# Patient Record
Sex: FEMALE | Race: WHITE | NOT HISPANIC OR LATINO | Employment: FULL TIME | URBAN - METROPOLITAN AREA
[De-identification: names, ages, dates, MRNs, and addresses within clinical notes are randomized per-mention and may not be internally consistent; named-entity substitution may affect disease eponyms.]

---

## 2023-05-07 ENCOUNTER — OFFICE VISIT (OUTPATIENT)
Age: 58
End: 2023-05-07

## 2023-05-07 VITALS
DIASTOLIC BLOOD PRESSURE: 82 MMHG | SYSTOLIC BLOOD PRESSURE: 139 MMHG | WEIGHT: 171 LBS | BODY MASS INDEX: 29.19 KG/M2 | HEIGHT: 64 IN | OXYGEN SATURATION: 99 % | RESPIRATION RATE: 18 BRPM | TEMPERATURE: 99.7 F | HEART RATE: 89 BPM

## 2023-05-07 DIAGNOSIS — Z00.00 ENCOUNTER FOR MEDICAL EXAMINATION TO ESTABLISH CARE: ICD-10-CM

## 2023-05-07 DIAGNOSIS — R10.11 RIGHT UPPER QUADRANT ABDOMINAL PAIN: ICD-10-CM

## 2023-05-07 DIAGNOSIS — N12 PYELONEPHRITIS: Primary | ICD-10-CM

## 2023-05-07 LAB
SL AMB  POCT GLUCOSE, UA: NEGATIVE
SL AMB LEUKOCYTE ESTERASE,UA: ABNORMAL
SL AMB POCT BILIRUBIN,UA: NEGATIVE
SL AMB POCT BLOOD,UA: ABNORMAL
SL AMB POCT CLARITY,UA: CLEAR
SL AMB POCT COLOR,UA: YELLOW
SL AMB POCT KETONES,UA: NEGATIVE
SL AMB POCT NITRITE,UA: POSITIVE
SL AMB POCT PH,UA: 5
SL AMB POCT SPECIFIC GRAVITY,UA: 1.01
SL AMB POCT URINE PROTEIN: 100
SL AMB POCT UROBILINOGEN: 0.2

## 2023-05-07 RX ORDER — SERTRALINE HYDROCHLORIDE 100 MG/1
100 TABLET, FILM COATED ORAL DAILY
COMMUNITY
Start: 2023-04-19

## 2023-05-07 RX ORDER — CIPROFLOXACIN 500 MG/1
500 TABLET, FILM COATED ORAL EVERY 12 HOURS SCHEDULED
Qty: 14 TABLET | Refills: 0 | Status: SHIPPED | OUTPATIENT
Start: 2023-05-07 | End: 2023-05-11

## 2023-05-07 RX ORDER — ONDANSETRON 4 MG/1
4 TABLET, FILM COATED ORAL EVERY 8 HOURS PRN
Qty: 21 TABLET | Refills: 0 | Status: SHIPPED | OUTPATIENT
Start: 2023-05-07 | End: 2023-05-14

## 2023-05-07 RX ORDER — LISINOPRIL 10 MG/1
5 TABLET ORAL DAILY
COMMUNITY
Start: 2023-04-19

## 2023-05-07 NOTE — PROGRESS NOTES
[address]        NAME: Geronimo Mahajan is a 62 y o  female  : 1965    MRN: [id number]  DATE: May 7, 2023  TIME: 11:44 AM    Assessment and Plan   Pyelonephritis [N12]  1  Pyelonephritis  ciprofloxacin (CIPRO) 500 mg tablet      2  Right upper quadrant abdominal pain  POCT urine dip    Urine culture    ondansetron (ZOFRAN) 4 mg tablet      3  Encounter for medical examination to establish care  Ambulatory Referral to [facility]        POC urine positive for UTI, will start on Cipro for presumed Pyelonephritis and send urine culture  VSS in clinic and patient overall appears well  Discussed with patient if symptoms don't improve or worsen within 24-48 hours, report to ER promptly for further evaluation of symptoms  Referral placed to family medicine to establish care and to manage chronic health conditions  Patient Instructions     Take antibiotics as directed, will call with urine culture results if need to change antibiotic  Complete entire course of antibiotic even if feeling better  Take medication with zofran and food to avoid upset stomach  May continue advil as needed for fever  Follow-up with family medicine within 3-5 days to establish care and manage long-term health conditions  Report to the ER promptly if no improvement of symptoms within 24-48 hours of starting antibiotic  Chief Complaint     Chief Complaint   Patient presents with   • Fever   • Vomiting   • Nausea     destin   • Diarrhea     Patient states that symptoms started 2 days ago, complains of right side pain  History of diverticulitis  Said she does not feel good  Otc taken  History of Present Illness       62year old female presents for evaluation of right-sided abdominal pain and fevers since Friday  Reports associated fevers, diarrhea (2 episodes), and some nausea  She reports a prior history of cholescystecomy, colon resection, and diverticulits  Last colonoscopy was 10 years ago   Denies any smoking, drinking, or illicit drug use  She has recently relocated from [facility] and has been unable to establish care in the area  She has taken Advil for fevers with some improvement  Abdominal Pain  This is a new problem  The current episode started in the past 7 days  The onset quality is sudden  The problem occurs constantly  The problem has been unchanged  The pain is located in the RUQ  The pain is at a severity of 8/10  The pain is moderate  The quality of the pain is aching and cramping  The abdominal pain radiates to the right flank  Associated symptoms include diarrhea, a fever and nausea  Pertinent negatives include no arthralgias, belching, constipation, dysuria, flatus, frequency, headaches, hematochezia, hematuria, melena, myalgias, vomiting or weight loss  Nothing aggravates the pain  The pain is relieved by nothing  Treatments tried: Advil  The treatment provided mild relief  Her past medical history is significant for abdominal surgery  Review of Systems   Review of Systems   Constitutional: Positive for chills and fever  Negative for activity change, appetite change, fatigue and weight loss  Respiratory: Negative for chest tightness and shortness of breath  Cardiovascular: Negative for chest pain and palpitations  Gastrointestinal: Positive for abdominal pain, diarrhea and nausea  Negative for constipation, flatus, hematochezia, melena and vomiting  Endocrine: Negative for polydipsia, polyphagia and polyuria  Genitourinary: Negative for decreased urine volume, difficulty urinating, dysuria, frequency and hematuria  Musculoskeletal: Positive for back pain  Negative for arthralgias and myalgias  Skin: Negative for color change and rash  Allergic/Immunologic: Negative for environmental allergies and food allergies  Neurological: Negative for dizziness, light-headedness and headaches           Current Medications       Current Outpatient Medications:   •  ciprofloxacin (CIPRO) 500 mg "tablet, Take 1 tablet (500 mg total) by mouth every 12 (twelve) hours for 7 days, Disp: 14 tablet, Rfl: 0  •  lisinopril (ZESTRIL) 10 mg tablet, Take 5 mg by mouth daily, Disp: , Rfl:   •  ondansetron (ZOFRAN) 4 mg tablet, Take 1 tablet (4 mg total) by mouth every 8 (eight) hours as needed for nausea or vomiting for up to 7 days, Disp: 21 tablet, Rfl: 0  •  sertraline (ZOLOFT) 100 mg tablet, Take 100 mg by mouth daily, Disp: , Rfl:     Current Allergies     Allergies as of 05/07/2023 - Reviewed 05/07/2023   Allergen Reaction Noted   • Gluten meal - food allergy Nausea Only 02/19/2021            The following portions of the patient's history were reviewed and updated as appropriate: allergies, current medications, past family history, past medical history, past social history, past surgical history and problem list      History reviewed  No pertinent past medical history  History reviewed  No pertinent surgical history  History reviewed  No pertinent family history  Medications have been verified  Objective   /82   Pulse 89   Temp 99 7 °F (37 6 °C)   Resp 18   Ht 5' 4\" (1 626 m)   Wt 77 6 kg (171 lb)   SpO2 99%   BMI 29 35 kg/m²        Physical Exam     Physical Exam  Vitals and nursing note reviewed  Constitutional:       Appearance: Normal appearance  She is normal weight  HENT:      Head: Normocephalic and atraumatic  Nose: No congestion or rhinorrhea  Mouth/Throat:      Mouth: Mucous membranes are moist    Eyes:      General: Scleral icterus: pro  Extraocular Movements: Extraocular movements intact  Conjunctiva/sclera: Conjunctivae normal       Pupils: Pupils are equal, round, and reactive to light  Cardiovascular:      Rate and Rhythm: Normal rate and regular rhythm  Pulses: Normal pulses  Heart sounds: Normal heart sounds  Pulmonary:      Effort: Pulmonary effort is normal       Breath sounds: Normal breath sounds     Abdominal:      General: " Bowel sounds are normal       Palpations: Abdomen is soft  Tenderness: There is abdominal tenderness  There is right CVA tenderness and guarding  There is no left CVA tenderness or rebound  Musculoskeletal:      Cervical back: Normal range of motion and neck supple  Skin:     General: Skin is warm and dry  Capillary Refill: Capillary refill takes less than 2 seconds  Neurological:      General: No focal deficit present  Mental Status: She is alert and oriented to person, place, and time  Psychiatric:         Mood and Affect: Mood normal          Behavior: Behavior normal          Thought Content:  Thought content normal          Judgment: Judgment normal

## 2023-05-07 NOTE — PATIENT INSTRUCTIONS
Take antibiotics as directed, will call with urine culture results if need to change antibiotic  Complete entire course of antibiotic even if feeling better  Take medication with zofran and food to avoid upset stomach  May continue advil as needed for fever  Follow-up with family medicine within 3-5 days to establish care and manage long-term health conditions  Report to the ER promptly if no improvement of symptoms within 24-48 hours of starting antibiotic

## 2023-05-08 ENCOUNTER — APPOINTMENT (EMERGENCY)
Dept: CT IMAGING | Facility: HOSPITAL | Age: 58
End: 2023-05-08

## 2023-05-08 ENCOUNTER — HOSPITAL ENCOUNTER (INPATIENT)
Facility: HOSPITAL | Age: 58
LOS: 3 days | Discharge: HOME | End: 2023-05-11
Attending: EMERGENCY MEDICINE | Admitting: FAMILY MEDICINE

## 2023-05-08 DIAGNOSIS — E87.6 HYPOKALEMIA: ICD-10-CM

## 2023-05-08 DIAGNOSIS — N12 PYELONEPHRITIS: Primary | ICD-10-CM

## 2023-05-08 DIAGNOSIS — A41.9 SEPSIS (HCC): ICD-10-CM

## 2023-05-08 PROBLEM — I10 HYPERTENSION: Status: ACTIVE | Noted: 2023-05-08

## 2023-05-08 PROBLEM — F32.A DEPRESSION: Status: ACTIVE | Noted: 2023-05-08

## 2023-05-08 LAB
ALBUMIN SERPL BCP-MCNC: 4.2 G/DL (ref 3.5–5)
ALP SERPL-CCNC: 54 U/L (ref 34–104)
ALT SERPL W P-5'-P-CCNC: 18 U/L (ref 7–52)
ANION GAP SERPL CALCULATED.3IONS-SCNC: 9 MMOL/L (ref 4–13)
AST SERPL W P-5'-P-CCNC: 14 U/L (ref 13–39)
BACTERIA UR QL AUTO: ABNORMAL /HPF
BASOPHILS # BLD AUTO: 0.03 THOUSANDS/ÂΜL (ref 0–0.1)
BASOPHILS NFR BLD AUTO: 0 % (ref 0–1)
BILIRUB SERPL-MCNC: 0.68 MG/DL (ref 0.2–1)
BILIRUB UR QL STRIP: NEGATIVE
BUN SERPL-MCNC: 14 MG/DL (ref 5–25)
CALCIUM SERPL-MCNC: 9.9 MG/DL (ref 8.4–10.2)
CHLORIDE SERPL-SCNC: 99 MMOL/L (ref 96–108)
CLARITY UR: ABNORMAL
CO2 SERPL-SCNC: 29 MMOL/L (ref 21–32)
COLOR UR: YELLOW
CREAT SERPL-MCNC: 0.77 MG/DL (ref 0.6–1.3)
EOSINOPHIL # BLD AUTO: 0.04 THOUSAND/ÂΜL (ref 0–0.61)
EOSINOPHIL NFR BLD AUTO: 0 % (ref 0–6)
ERYTHROCYTE [DISTWIDTH] IN BLOOD BY AUTOMATED COUNT: 12.8 % (ref 11.6–15.1)
GFR SERPL CREATININE-BSD FRML MDRD: 85 ML/MIN/1.73SQ M
GLUCOSE SERPL-MCNC: 134 MG/DL (ref 65–140)
GLUCOSE UR STRIP-MCNC: NEGATIVE MG/DL
HCT VFR BLD AUTO: 38.2 % (ref 34.8–46.1)
HGB BLD-MCNC: 13 G/DL (ref 11.5–15.4)
HGB UR QL STRIP.AUTO: ABNORMAL
IMM GRANULOCYTES # BLD AUTO: 0.09 THOUSAND/UL (ref 0–0.2)
IMM GRANULOCYTES NFR BLD AUTO: 1 % (ref 0–2)
KETONES UR STRIP-MCNC: NEGATIVE MG/DL
LACTATE SERPL-SCNC: 0.6 MMOL/L (ref 0.5–2)
LEUKOCYTE ESTERASE UR QL STRIP: ABNORMAL
LIPASE SERPL-CCNC: 7 U/L (ref 11–82)
LYMPHOCYTES # BLD AUTO: 1.01 THOUSANDS/ÂΜL (ref 0.6–4.47)
LYMPHOCYTES NFR BLD AUTO: 5 % (ref 14–44)
MCH RBC QN AUTO: 28.3 PG (ref 26.8–34.3)
MCHC RBC AUTO-ENTMCNC: 34 G/DL (ref 31.4–37.4)
MCV RBC AUTO: 83 FL (ref 82–98)
MONOCYTES # BLD AUTO: 1.45 THOUSAND/ÂΜL (ref 0.17–1.22)
MONOCYTES NFR BLD AUTO: 8 % (ref 4–12)
MUCOUS THREADS UR QL AUTO: ABNORMAL
NEUTROPHILS # BLD AUTO: 16.17 THOUSANDS/ÂΜL (ref 1.85–7.62)
NEUTS SEG NFR BLD AUTO: 86 % (ref 43–75)
NITRITE UR QL STRIP: NEGATIVE
NON-SQ EPI CELLS URNS QL MICRO: ABNORMAL /HPF
NRBC BLD AUTO-RTO: 0 /100 WBCS
PH UR STRIP.AUTO: 6 [PH]
PLATELET # BLD AUTO: 254 THOUSANDS/UL (ref 149–390)
PMV BLD AUTO: 10.6 FL (ref 8.9–12.7)
POTASSIUM SERPL-SCNC: 2.9 MMOL/L (ref 3.5–5.3)
PROCALCITONIN SERPL-MCNC: 0.34 NG/ML
PROT SERPL-MCNC: 7.7 G/DL (ref 6.4–8.4)
PROT UR STRIP-MCNC: ABNORMAL MG/DL
RBC # BLD AUTO: 4.6 MILLION/UL (ref 3.81–5.12)
RBC #/AREA URNS AUTO: ABNORMAL /HPF
SODIUM SERPL-SCNC: 137 MMOL/L (ref 135–147)
SP GR UR STRIP.AUTO: 1.02 (ref 1–1.03)
UROBILINOGEN UR STRIP-ACNC: 2 MG/DL
WBC # BLD AUTO: 18.79 THOUSAND/UL (ref 4.31–10.16)
WBC #/AREA URNS AUTO: ABNORMAL /HPF

## 2023-05-08 RX ORDER — POTASSIUM CHLORIDE 29.8 MG/ML
40 INJECTION INTRAVENOUS ONCE
Status: DISCONTINUED | OUTPATIENT
Start: 2023-05-08 | End: 2023-05-08

## 2023-05-08 RX ORDER — LISINOPRIL 5 MG/1
5 TABLET ORAL DAILY
Status: DISCONTINUED | OUTPATIENT
Start: 2023-05-09 | End: 2023-05-11 | Stop reason: HOSPADM

## 2023-05-08 RX ORDER — SERTRALINE HYDROCHLORIDE 100 MG/1
100 TABLET, FILM COATED ORAL DAILY
Status: DISCONTINUED | OUTPATIENT
Start: 2023-05-09 | End: 2023-05-11 | Stop reason: HOSPADM

## 2023-05-08 RX ORDER — POTASSIUM CHLORIDE 14.9 MG/ML
20 INJECTION INTRAVENOUS
Status: COMPLETED | OUTPATIENT
Start: 2023-05-08 | End: 2023-05-09

## 2023-05-08 RX ORDER — ONDANSETRON 2 MG/ML
4 INJECTION INTRAMUSCULAR; INTRAVENOUS ONCE
Status: COMPLETED | OUTPATIENT
Start: 2023-05-08 | End: 2023-05-08

## 2023-05-08 RX ORDER — ONDANSETRON 2 MG/ML
4 INJECTION INTRAMUSCULAR; INTRAVENOUS EVERY 6 HOURS PRN
Status: DISCONTINUED | OUTPATIENT
Start: 2023-05-08 | End: 2023-05-11 | Stop reason: HOSPADM

## 2023-05-08 RX ORDER — OXYBUTYNIN CHLORIDE 5 MG/1
5 TABLET ORAL 3 TIMES DAILY
Status: DISCONTINUED | OUTPATIENT
Start: 2023-05-08 | End: 2023-05-11 | Stop reason: HOSPADM

## 2023-05-08 RX ORDER — ENOXAPARIN SODIUM 100 MG/ML
40 INJECTION SUBCUTANEOUS DAILY
Status: DISCONTINUED | OUTPATIENT
Start: 2023-05-09 | End: 2023-05-11 | Stop reason: HOSPADM

## 2023-05-08 RX ORDER — ACETAMINOPHEN 325 MG/1
650 TABLET ORAL EVERY 6 HOURS PRN
Status: DISCONTINUED | OUTPATIENT
Start: 2023-05-08 | End: 2023-05-11 | Stop reason: HOSPADM

## 2023-05-08 RX ADMIN — ACETAMINOPHEN 650 MG: 325 TABLET ORAL at 23:33

## 2023-05-08 RX ADMIN — POTASSIUM CHLORIDE 20 MEQ: 14.9 INJECTION, SOLUTION INTRAVENOUS at 20:50

## 2023-05-08 RX ADMIN — SODIUM CHLORIDE 1000 ML: 0.9 INJECTION, SOLUTION INTRAVENOUS at 23:37

## 2023-05-08 RX ADMIN — SODIUM CHLORIDE 400 ML: 0.9 INJECTION, SOLUTION INTRAVENOUS at 21:15

## 2023-05-08 RX ADMIN — ONDANSETRON 4 MG: 2 INJECTION INTRAMUSCULAR; INTRAVENOUS at 23:35

## 2023-05-08 RX ADMIN — SODIUM CHLORIDE 1000 ML: 0.9 INJECTION, SOLUTION INTRAVENOUS at 19:39

## 2023-05-08 RX ADMIN — ONDANSETRON 4 MG: 2 INJECTION INTRAMUSCULAR; INTRAVENOUS at 21:02

## 2023-05-08 RX ADMIN — OXYBUTYNIN CHLORIDE 5 MG: 5 TABLET ORAL at 23:33

## 2023-05-08 RX ADMIN — IOHEXOL 100 ML: 350 INJECTION, SOLUTION INTRAVENOUS at 20:40

## 2023-05-08 RX ADMIN — CEFTRIAXONE SODIUM 2000 MG: 10 INJECTION, POWDER, FOR SOLUTION INTRAVENOUS at 21:50

## 2023-05-08 NOTE — ED PROVIDER NOTES
Pt Name: Anjali Jones  MRN: [id number]  [place] 1965  Age/Sex: 62 y o  female  Date of evaluation: 5/8/2023  PCP: No primary care provider on file  CHIEF COMPLAINT    Chief Complaint   Patient presents with   • Abdominal Pain     RUQ abdominal pain since Saturday, fever tmax 103 5, nausea and vomiting          HPI    62 y o  female presenting with right upper quadrant abdominal pain  Patient states that she began having the pain on Saturday, pain is currently dull, moderate to severe, in the right upper quadrant, radiating throughout the abdomen, worse with movement better at rest   Patient also notes fevers, with Tmax of 103 5  Patient was seen in urgent care and started on Cipro 3 days ago, states that since then she has had persistent nausea and vomiting has not been able to keep down food, fluids, or meds  Patient denies trauma, chest pain, shortness of breath, other symptoms  Patient denies changes in urine or bowel movements but notes prior episodes of UTIs without frequency urgency or pain  She notes a history of MS, bladder was last checked for urinary retention about 10 years ago  HPI      Past Medical and Surgical History    Notes past medical history of MS,  liver abscess as well as prior urinary tract infections and pyelonephritis  Past surgical history remarkable for cholecystectomy, partial colectomy, and hysterectomy      Family abdominal pain  Patient states that she began having pain history noncontributory      Social History     Tobacco Use   • Smoking status: Never   • Smokeless tobacco: Never   Substance Use Topics   • Alcohol use: Not Currently   • Drug use: Not Currently           Allergies    Allergies   Allergen Reactions   • Gluten Meal - Food Allergy Nausea Only       Home Medications    Prior to Admission medications    Medication Sig Start Date End Date Taking?  Authorizing Provider   ciprofloxacin (CIPRO) 500 mg tablet Take 1 tablet (500 mg total) by mouth every 12 (twelve) hours for 7 days 5/7/23 5/14/23  JOSH Carcamo   lisinopril (ZESTRIL) 10 mg tablet Take 5 mg by mouth daily 4/19/23   Historical Provider, MD   ondansetron (ZOFRAN) 4 mg tablet Take 1 tablet (4 mg total) by mouth every 8 (eight) hours as needed for nausea or vomiting for up to 7 days 5/7/23 5/14/23  JOSH Carcamo   sertraline (ZOLOFT) 100 mg tablet Take 100 mg by mouth daily 4/19/23   Historical Provider, MD           Review of Systems    Review of Systems   Constitutional: Negative for activity change, chills and fever  HENT: Negative for drooling and facial swelling  Eyes: Negative for pain, discharge and visual disturbance  Respiratory: Negative for apnea, cough, chest tightness, shortness of breath and wheezing  Cardiovascular: Negative for chest pain and leg swelling  Gastrointestinal: Positive for abdominal pain, nausea and vomiting  Negative for constipation and diarrhea  Genitourinary: Negative for difficulty urinating, dysuria and urgency  Musculoskeletal: Negative for arthralgias, back pain and gait problem  Skin: Negative for color change and rash  Neurological: Negative for dizziness, speech difficulty, weakness and headaches  Psychiatric/Behavioral: Negative for agitation, behavioral problems and confusion  All other systems reviewed and negative  Physical Exam      ED Triage Vitals   Temperature Pulse Respirations Blood Pressure SpO2   05/08/23 1810 05/08/23 1810 05/08/23 1810 05/08/23 1810 05/08/23 1810   99 2 °F (37 3 °C) (!) 109 18 147/74 96 %      Temp Source Heart Rate Source Patient Position - Orthostatic VS BP Location FiO2 (%)   05/08/23 1810 05/08/23 1810 05/08/23 1810 05/08/23 1810 --   Oral Monitor Sitting Left arm       Pain Score       05/08/23 1906       6               Physical Exam  Vitals and nursing note reviewed  Constitutional:       General: She is not in acute distress  Appearance: She is well-developed   She is ill-appearing  She is not toxic-appearing or diaphoretic  HENT:      Head: Normocephalic and atraumatic  Right Ear: External ear normal       Left Ear: External ear normal       Nose: Nose normal  No congestion or rhinorrhea  Mouth/Throat:      Mouth: Mucous membranes are dry  Pharynx: Oropharynx is clear  No oropharyngeal exudate or posterior oropharyngeal erythema  Eyes:      Conjunctiva/sclera: Conjunctivae normal       Pupils: Pupils are equal, round, and reactive to light  Cardiovascular:      Rate and Rhythm: Normal rate and regular rhythm  Pulses: Normal pulses  Heart sounds: Normal heart sounds  Pulmonary:      Effort: Pulmonary effort is normal  No respiratory distress  Breath sounds: Normal breath sounds  No wheezing or rales  Abdominal:      General: There is no distension  Palpations: Abdomen is soft  Tenderness: There is abdominal tenderness  There is right CVA tenderness and left CVA tenderness  There is no guarding or rebound  Comments: Tender to palpation the right upper quadrant, no rebound or guarding  Musculoskeletal:         General: No deformity or signs of injury  Normal range of motion  Cervical back: Normal range of motion and neck supple  Right lower leg: No edema  Left lower leg: No edema  Skin:     General: Skin is warm and dry  Capillary Refill: Capillary refill takes less than 2 seconds  Findings: No erythema or rash  Neurological:      Mental Status: She is alert and oriented to person, place, and time  Psychiatric:         Behavior: Behavior normal          Thought Content:  Thought content normal          Judgment: Judgment normal               Diagnostic Results      Labs:    Results Reviewed     Procedure Component Value Units Date/Time    Procalcitonin, Next Day AM Collection [[id number]]     Lab Status: No result Specimen: Blood     Comprehensive metabolic panel [[id number]]     Lab Status: No result Specimen: Blood     CBC (With Platelets) [[id number]]     Lab Status: No result Specimen: Blood     Procalcitonin [[id number]]  (Abnormal) Collected: 05/08/23 2153    Lab Status: Final result Specimen: Blood from Arm, Right Updated: 05/08/23 2226     Procalcitonin 0 34 ng/ml     Blood culture #1 [[id number]] Collected: 05/08/23 2215    Lab Status: In process Specimen: Blood from Arm, Right Updated: 05/08/23 2219    Lactic acid, plasma (w/reflex if result > 2 0) [[id number]]  (Normal) Collected: 05/08/23 2153    Lab Status: Final result Specimen: Blood from Arm, Right Updated: 05/08/23 2214     LACTIC ACID 0 6 mmol/L     Narrative:      Result may be elevated if tourniquet was used during collection  Blood culture #2 [[id number]] Collected: 05/08/23 2153    Lab Status:  In process Specimen: Blood from Arm, Right Updated: 05/08/23 2155    Lipase [[id number]]  (Abnormal) Collected: 05/08/23 1938    Lab Status: Final result Specimen: Blood from Arm, Left Updated: 05/08/23 2002     Lipase 7 u/L     Comprehensive metabolic panel [[id number]]  (Abnormal) Collected: 05/08/23 1938    Lab Status: Final result Specimen: Blood from Arm, Left Updated: 05/08/23 2002     Sodium 137 mmol/L      Potassium 2 9 mmol/L      Chloride 99 mmol/L      CO2 29 mmol/L      ANION GAP 9 mmol/L      BUN 14 mg/dL      Creatinine 0 77 mg/dL      Glucose 134 mg/dL      Calcium 9 9 mg/dL      AST 14 U/L      ALT 18 U/L      Alkaline Phosphatase 54 U/L      Total Protein 7 7 g/dL      Albumin 4 2 g/dL      Total Bilirubin 0 68 mg/dL      eGFR 85 ml/min/1 73sq m     Narrative:      [place] guidelines for Chronic Kidney Disease (CKD):   •  Stage 1 with normal or high GFR (GFR > 90 mL/min/1 73 square meters)  •  Stage 2 Mild CKD (GFR = 60-89 mL/min/1 73 square meters)  •  Stage 3A Moderate CKD (GFR = 45-59 mL/min/1 73 square meters)  •  Stage 3B Moderate CKD (GFR = 30-44 mL/min/1 73 square meters)  •  Stage 4 Severe CKD (GFR = 15-29 mL/min/1 73 square meters)  •  Stage 5 End Stage CKD (GFR <15 mL/min/1 73 square meters)  Note: GFR calculation is accurate only with a steady state creatinine    Urine Microscopic [[id number]]  (Abnormal) Collected: 05/08/23 1938    Lab Status: Final result Specimen: Urine, Clean Catch Updated: 05/08/23 1947     RBC, UA 2-4 /hpf      WBC, UA Innumerable /hpf      Epithelial Cells Occasional /hpf      Bacteria, UA None Seen /hpf      MUCUS THREADS Occasional    Urine culture [[id number]] Collected: 05/08/23 1938    Lab Status:  In process Specimen: Urine, Clean Catch Updated: 05/08/23 1947    UA w Reflex to Microscopic w Reflex to Culture [[id number]]  (Abnormal) Collected: 05/08/23 1938    Lab Status: Final result Specimen: Urine, Clean Catch Updated: 05/08/23 1946     Color, UA Yellow     Clarity, UA Turbid     Specific Gravity, UA 1 022     pH, UA 6 0     Leukocytes, UA Moderate     Nitrite, UA Negative     Protein, UA 70 (1+) mg/dl      Glucose, UA Negative mg/dl      Ketones, UA Negative mg/dl      Urobilinogen, UA 2 0 mg/dl      Bilirubin, UA Negative     Occult Blood, UA Trace    CBC and differential [[id number]]  (Abnormal) Collected: 05/08/23 1938    Lab Status: Final result Specimen: Blood from Arm, Left Updated: 05/08/23 1945     WBC 18 79 Thousand/uL      RBC 4 60 Million/uL      Hemoglobin 13 0 g/dL      Hematocrit 38 2 %      MCV 83 fL      MCH 28 3 pg      MCHC 34 0 g/dL      RDW 12 8 %      MPV 10 6 fL      Platelets 198 Thousands/uL      nRBC 0 /100 WBCs      Neutrophils Relative 86 %      Immat GRANS % 1 %      Lymphocytes Relative 5 %      Monocytes Relative 8 %      Eosinophils Relative 0 %      Basophils Relative 0 %      Neutrophils Absolute 16 17 Thousands/µL      Immature Grans Absolute 0 09 Thousand/uL      Lymphocytes Absolute 1 01 Thousands/µL      Monocytes Absolute 1 45 Thousand/µL      Eosinophils Absolute 0 04 Thousand/µL      Basophils Absolute 0 03 Thousands/µL           All labs reviewed and utilized in the medical decision making process    Radiology:    CT abdomen pelvis with contrast   Final Result      Multiple hypoenhancing regions throughout both kidneys with perinephric stranding consistent with multifocal pyelonephritis  The study was marked in [facility] for immediate notification  Workstation performed: [id number]             All radiology studies independently viewed by me and interpreted by the radiologist     Procedure    Procedures        ED Course of Care and Re-Assessments      CT performed, consistent with bilateral pyelonephritis  Started on broad-spectrum antibiotics  Urinalysis consistent with infection despite previous use of Cipro  Seen repleted      Medications   lisinopril (ZESTRIL) tablet 5 mg (has no administration in time range)   sertraline (ZOLOFT) tablet 100 mg (has no administration in time range)   oxybutynin (DITROPAN) tablet 5 mg (5 mg Oral Given 5/8/23 2333)   acetaminophen (TYLENOL) tablet 650 mg (650 mg Oral Given 5/8/23 2333)   enoxaparin (LOVENOX) subcutaneous injection 40 mg (has no administration in time range)   ondansetron (ZOFRAN) injection 4 mg (4 mg Intravenous Given 5/8/23 2335)   ceftriaxone (ROCEPHIN) 1 g/50 mL in dextrose IVPB (has no administration in time range)   multi-electrolyte (PLASMALYTE-A/ISOLYTE-S PH 7 4) IV solution (75 mL/hr Intravenous New Bag 5/9/23 0141)   sodium chloride 0 9 % bolus 1,000 mL (0 mL Intravenous Stopped 5/8/23 2330)   potassium chloride 20 mEq IVPB (premix) (20 mEq Intravenous New Bag 5/9/23 0156)   iohexol (OMNIPAQUE) 350 MG/ML injection (SINGLE-DOSE) 100 mL (100 mL Intravenous Given 5/8/23 2040)   ondansetron (ZOFRAN) injection 4 mg (4 mg Intravenous Given 5/8/23 2102)   ceftriaxone (ROCEPHIN) 2 g/50 mL in dextrose IVPB (0 mg Intravenous Stopped 5/8/23 2330)   sodium chloride 0 9 % bolus 1,000 mL (0 mL Intravenous Stopped 5/9/23 0215)   sodium chloride 0 9 % bolus 400 mL (0 mL Intravenous Stopped "5/8/23 5365)           FINAL IMPRESSION    Final diagnoses:   Pyelonephritis   Sepsis ([facility] [address] )   Hypokalemia         DISPOSITION/PLAN    Presentation as above felt most consistent with sepsis with pyelonephritis as a source  Hypokalemia also noted, likely due to persistent vomiting  Diagnostics in emergency department as above, consistent with sepsis but not severe sepsis or septic shock at time of ER stay  No evidence of bowel obstruction, intra-abdominal abscess, other surgical process  Started on broad-spectrum antibiotics, resuscitated with IV fluids, admitted to internal medicine for further care  Hemodynamically stable and comfortable at time of admit  Time reflects when diagnosis was documented in both MDM as applicable and the Disposition within this note     Time User Action Codes Description Comment    5/8/2023  9:44 PM Kelsy Craft Add [N12] Pyelonephritis     5/8/2023  9:44 PM Patria Poplin T Add [A41 9] Sepsis ([facility] [address] )     5/9/2023  4:37 AM Patria Poplin T Add [E87 6] Hypokalemia       ED Disposition     ED Disposition   Admit    Condition   Stable    Date/Time   Mon May 8, 2023  9:44 PM    Comment   Case was discussed with DOLORES and the patient's admission status was agreed to be Admission Status: inpatient status to the service of Dr Marco A Mendoza  Follow-up Information    None           PATIENT REFERRED TO:    No follow-up provider specified  DISCHARGE MEDICATIONS:    Patient's Medications   Discharge Prescriptions    No medications on file       No discharge procedures on file  Nicholas Ricketts MD    Portions of the record may have been created with voice recognition software  Occasional wrong word or \"sound alike\" substitutions may have occurred due to the inherent limitations of voice recognition software    Please read the chart carefully and recognize, using context, where substitutions have occurred     Nicholas Ricketts MD  05/09/23 0287    "

## 2023-05-08 NOTE — LETTER
[address] [place] [address]  Dept: [phone]    May 11, 2023     Patient: Giovanna Ortiz   YOB: 1965   Date of Visit: 5/8/2023       To Whom it May Concern:    Giovanna Ortiz is under my professional care  She was seen in the hospital from 5/8/2023 to 05/11/23  She may return to work on 05/18/2023 without limitations  If you have any questions or concerns, please don't hesitate to call           Sincerely,          Josy Stovall MD

## 2023-05-09 PROBLEM — N12 PYELONEPHRITIS: Status: ACTIVE | Noted: 2023-05-09

## 2023-05-09 LAB
ALBUMIN SERPL BCP-MCNC: 3.2 G/DL (ref 3.5–5)
ALP SERPL-CCNC: 38 U/L (ref 34–104)
ALT SERPL W P-5'-P-CCNC: 14 U/L (ref 7–52)
ANION GAP SERPL CALCULATED.3IONS-SCNC: 6 MMOL/L (ref 4–13)
AST SERPL W P-5'-P-CCNC: 12 U/L (ref 13–39)
BILIRUB SERPL-MCNC: 0.41 MG/DL (ref 0.2–1)
BUN SERPL-MCNC: 8 MG/DL (ref 5–25)
CALCIUM ALBUM COR SERPL-MCNC: 8.8 MG/DL (ref 8.3–10.1)
CALCIUM SERPL-MCNC: 8.2 MG/DL (ref 8.4–10.2)
CHLORIDE SERPL-SCNC: 108 MMOL/L (ref 96–108)
CO2 SERPL-SCNC: 26 MMOL/L (ref 21–32)
CREAT SERPL-MCNC: 0.58 MG/DL (ref 0.6–1.3)
ERYTHROCYTE [DISTWIDTH] IN BLOOD BY AUTOMATED COUNT: 13 % (ref 11.6–15.1)
GFR SERPL CREATININE-BSD FRML MDRD: 102 ML/MIN/1.73SQ M
GLUCOSE SERPL-MCNC: 114 MG/DL (ref 65–140)
HCT VFR BLD AUTO: 32 % (ref 34.8–46.1)
HGB BLD-MCNC: 10.4 G/DL (ref 11.5–15.4)
MCH RBC QN AUTO: 27.5 PG (ref 26.8–34.3)
MCHC RBC AUTO-ENTMCNC: 32.5 G/DL (ref 31.4–37.4)
MCV RBC AUTO: 85 FL (ref 82–98)
PLATELET # BLD AUTO: 206 THOUSANDS/UL (ref 149–390)
PMV BLD AUTO: 10.7 FL (ref 8.9–12.7)
POTASSIUM SERPL-SCNC: 3.2 MMOL/L (ref 3.5–5.3)
PROCALCITONIN SERPL-MCNC: 0.33 NG/ML
PROT SERPL-MCNC: 5.9 G/DL (ref 6.4–8.4)
RBC # BLD AUTO: 3.78 MILLION/UL (ref 3.81–5.12)
SODIUM SERPL-SCNC: 140 MMOL/L (ref 135–147)
WBC # BLD AUTO: 11.55 THOUSAND/UL (ref 4.31–10.16)

## 2023-05-09 RX ORDER — HYDROMORPHONE HCL/PF 1 MG/ML
0.5 SYRINGE (ML) INJECTION EVERY 6 HOURS PRN
Status: DISCONTINUED | OUTPATIENT
Start: 2023-05-09 | End: 2023-05-11 | Stop reason: HOSPADM

## 2023-05-09 RX ORDER — POTASSIUM CHLORIDE 20MEQ/15ML
40 LIQUID (ML) ORAL ONCE
Status: DISCONTINUED | OUTPATIENT
Start: 2023-05-09 | End: 2023-05-09

## 2023-05-09 RX ORDER — POTASSIUM CHLORIDE 20 MEQ/1
40 TABLET, EXTENDED RELEASE ORAL ONCE
Status: COMPLETED | OUTPATIENT
Start: 2023-05-09 | End: 2023-05-09

## 2023-05-09 RX ORDER — SACCHAROMYCES BOULARDII 250 MG
250 CAPSULE ORAL 2 TIMES DAILY
Status: DISCONTINUED | OUTPATIENT
Start: 2023-05-09 | End: 2023-05-11 | Stop reason: HOSPADM

## 2023-05-09 RX ORDER — SODIUM CHLORIDE, SODIUM GLUCONATE, SODIUM ACETATE, POTASSIUM CHLORIDE, MAGNESIUM CHLORIDE, SODIUM PHOSPHATE, DIBASIC, AND POTASSIUM PHOSPHATE .53; .5; .37; .037; .03; .012; .00082 G/100ML; G/100ML; G/100ML; G/100ML; G/100ML; G/100ML; G/100ML
75 INJECTION, SOLUTION INTRAVENOUS CONTINUOUS
Status: DISCONTINUED | OUTPATIENT
Start: 2023-05-09 | End: 2023-05-11 | Stop reason: HOSPADM

## 2023-05-09 RX ADMIN — SODIUM CHLORIDE, SODIUM GLUCONATE, SODIUM ACETATE, POTASSIUM CHLORIDE AND MAGNESIUM CHLORIDE 75 ML/HR: 526; 502; 368; 37; 30 INJECTION, SOLUTION INTRAVENOUS at 01:41

## 2023-05-09 RX ADMIN — HYDROMORPHONE HYDROCHLORIDE 0.5 MG: 1 INJECTION, SOLUTION INTRAMUSCULAR; INTRAVENOUS; SUBCUTANEOUS at 20:59

## 2023-05-09 RX ADMIN — CEFTRIAXONE SODIUM 1000 MG: 10 INJECTION, POWDER, FOR SOLUTION INTRAVENOUS at 21:00

## 2023-05-09 RX ADMIN — POTASSIUM CHLORIDE 20 MEQ: 14.9 INJECTION, SOLUTION INTRAVENOUS at 01:56

## 2023-05-09 RX ADMIN — OXYBUTYNIN CHLORIDE 5 MG: 5 TABLET ORAL at 21:00

## 2023-05-09 RX ADMIN — POTASSIUM CHLORIDE 40 MEQ: 1500 TABLET, EXTENDED RELEASE ORAL at 09:38

## 2023-05-09 RX ADMIN — ENOXAPARIN SODIUM 40 MG: 40 INJECTION SUBCUTANEOUS at 08:33

## 2023-05-09 RX ADMIN — OXYBUTYNIN CHLORIDE 5 MG: 5 TABLET ORAL at 08:33

## 2023-05-09 RX ADMIN — Medication 250 MG: at 08:33

## 2023-05-09 RX ADMIN — SODIUM CHLORIDE, SODIUM GLUCONATE, SODIUM ACETATE, POTASSIUM CHLORIDE AND MAGNESIUM CHLORIDE 75 ML/HR: 526; 502; 368; 37; 30 INJECTION, SOLUTION INTRAVENOUS at 20:59

## 2023-05-09 RX ADMIN — SERTRALINE HYDROCHLORIDE 100 MG: 100 TABLET ORAL at 08:52

## 2023-05-09 RX ADMIN — LISINOPRIL 5 MG: 5 TABLET ORAL at 08:33

## 2023-05-09 RX ADMIN — ONDANSETRON 4 MG: 2 INJECTION INTRAMUSCULAR; INTRAVENOUS at 20:58

## 2023-05-09 RX ADMIN — Medication 250 MG: at 18:31

## 2023-05-09 RX ADMIN — OXYBUTYNIN CHLORIDE 5 MG: 5 TABLET ORAL at 16:53

## 2023-05-09 NOTE — PLAN OF CARE
Problem: GASTROINTESTINAL - ADULT  Goal: Minimal or absence of nausea and/or vomiting  Description: INTERVENTIONS:  - Administer IV fluids if ordered to ensure adequate hydration  - Maintain NPO status until nausea and vomiting are resolved  - Nasogastric tube if ordered  - Administer ordered antiemetic medications as needed  - Provide nonpharmacologic comfort measures as appropriate  - Advance diet as tolerated, if ordered  - Consider nutrition services referral to assist patient with adequate nutrition and appropriate food choices  Outcome: Progressing  Goal: Maintains adequate nutritional intake  Description: INTERVENTIONS:  - Monitor percentage of each meal consumed  - Identify factors contributing to decreased intake, treat as appropriate  - Assist with meals as needed  - Monitor I&O, weight, and lab values if indicated  - Obtain nutrition services referral as needed  Outcome: Progressing     Problem: GENITOURINARY - ADULT  Goal: Maintains or returns to baseline urinary function  Description: INTERVENTIONS:  - Assess urinary function  - Encourage oral fluids to ensure adequate hydration if ordered  - Administer IV fluids as ordered to ensure adequate hydration  - Administer ordered medications as needed  - Offer frequent toileting  - Follow urinary retention protocol if ordered  Outcome: Progressing     Problem: METABOLIC, FLUID AND ELECTROLYTES - ADULT  Goal: Electrolytes maintained within normal limits  Description: INTERVENTIONS:  - Monitor labs and assess patient for signs and symptoms of electrolyte imbalances  - Administer electrolyte replacement as ordered  - Monitor response to electrolyte replacements, including repeat lab results as appropriate  - Instruct patient on fluid and nutrition as appropriate  Outcome: Progressing

## 2023-05-09 NOTE — PROGRESS NOTES
[address] [place] [address]  Progress Note  Name: Monty Vargas  MRN: [id number]  Unit/Bed#: ED 08 I Date of Admission: 5/8/2023   Date of Service: 5/9/2023 I Hospital Day: 1    Assessment/Plan   Pyelonephritis  Assessment & Plan  · Noted on CT - Multiple hypoenhancing regions throughout both kidneys with perinephric stranding consistent with multifocal pyelonephritis  · This is causing sepsis as mentioned  · Continue ATBs    * Sepsis [facility])  Assessment & Plan  Patient presenting to the ED for fever/chills, fatigue, dysuria, flank pain  Patient was seen by urgent care a few days ago and prescribed ciprofloxacin for UTI  Due to persistent pain and vomiting patient returning to ED for evaluation  · Meeting sepsis criteria for leukocytosis and tachycardia + pyelo  · CT A/P: Multiple hypoenhancing regions throughout both kidneys with perinephric stranding consistent with multifocal pyelonephritis  · Continue ceftriaxone  · Follow cultures    Depression  Assessment & Plan  · Mood stable  · Continue home Zoloft  · Continue to monitor    Hypertension  Assessment & Plan  · BP stable on admission  · Continue home lisinopril  · Monitor blood pressure         VTE Pharmacologic Prophylaxis:   Pharmacologic: Enoxaparin (Lovenox)  Mechanical VTE Prophylaxis in Place: Yes    Patient Centered Rounds: I have performed bedside rounds with nursing staff today  Discussions with Specialists or Other Care Team Provider: Discussed with care management team    Education and Discussions with Family / Patient: Patient - she did not ask me to talk to anyone    Time Spent for Care: 1 hour  More than 50% of total time spent on counseling and coordination of care as described above      Current Length of Stay: 1 day(s)    Current Patient Status: Inpatient   Certification Statement: The patient will continue to require additional inpatient hospital stay due to need for IV antibiotics, waiting for cultures    Discharge Plan: 48-72h    Code Status: Level 1 - Full Code      Subjective:     Patient evaluated this morning  She does mention that her pain is improving in the flank area but  Denies any nausea or vomiting  No other events reported    Objective:     Vitals:   Temp (24hrs), Av 4 °F (37 4 °C), Min:99 2 °F (37 3 °C), Max:99 6 °F (37 6 °C)    Temp:  [99 2 °F (37 3 °C)-99 6 °F (37 6 °C)] 99 6 °F (37 6 °C)  HR:  [] 77  Resp:  [16-18] 16  BP: (121-147)/(65-74) 121/71  SpO2:  [96 %-98 %] 96 %  Body mass index is 29 87 kg/m²  Input and Output Summary (last 24 hours): Intake/Output Summary (Last 24 hours) at 2023 0933  Last data filed at 2023 0501  Gross per 24 hour   Intake 2800 ml   Output --   Net 2800 ml       Physical Exam:     Physical Exam  Vitals and nursing note reviewed  Constitutional:       Appearance: Normal appearance  She is normal weight  Comments: Female in bed, awake   HENT:      Head: Normocephalic and atraumatic  Right Ear: External ear normal       Left Ear: External ear normal       Nose: Nose normal  No congestion  Mouth/Throat:      Mouth: Mucous membranes are moist       Pharynx: Oropharynx is clear  No oropharyngeal exudate or posterior oropharyngeal erythema  Eyes:      General: No scleral icterus  Right eye: No discharge  Left eye: No discharge  Extraocular Movements: Extraocular movements intact  Conjunctiva/sclera: Conjunctivae normal       Pupils: Pupils are equal, round, and reactive to light  Cardiovascular:      Rate and Rhythm: Normal rate and regular rhythm  Pulses: Normal pulses  Heart sounds: Normal heart sounds  No murmur heard  No friction rub  No gallop  Pulmonary:      Effort: Pulmonary effort is normal  No respiratory distress  Breath sounds: Normal breath sounds  No stridor  No wheezing, rhonchi or rales  Chest:      Chest wall: No tenderness  Abdominal:      General: Abdomen is flat   Bowel sounds are normal  There is no distension  Palpations: Abdomen is soft  There is no mass  Tenderness: There is no abdominal tenderness  There is no guarding or rebound  Musculoskeletal:         General: No swelling, tenderness, deformity or signs of injury  Normal range of motion  Cervical back: Normal range of motion and neck supple  No rigidity  No muscular tenderness  Skin:     General: Skin is warm and dry  Capillary Refill: Capillary refill takes less than 2 seconds  Coloration: Skin is not jaundiced or pale  Findings: No bruising, erythema, lesion or rash  Neurological:      General: No focal deficit present  Mental Status: She is alert and oriented to person, place, and time  Mental status is at baseline  Cranial Nerves: No cranial nerve deficit  Sensory: No sensory deficit  Motor: No weakness  Coordination: Coordination normal    Psychiatric:         Mood and Affect: Mood normal          Behavior: Behavior normal          Thought Content: Thought content normal          Judgment: Judgment normal            Additional Data:     Labs:    Results from last 7 days   Lab Units 05/09/23  0517 05/08/23  1938   WBC Thousand/uL 11 55* 18 79*   HEMOGLOBIN g/dL 10 4* 13 0   HEMATOCRIT % 32 0* 38 2   PLATELETS Thousands/uL 206 254   NEUTROS PCT %  --  86*   LYMPHS PCT %  --  5*   MONOS PCT %  --  8   EOS PCT %  --  0     Results from last 7 days   Lab Units 05/09/23  0517   SODIUM mmol/L 140   POTASSIUM mmol/L 3 2*   CHLORIDE mmol/L 108   CO2 mmol/L 26   BUN mg/dL 8   CREATININE mg/dL 0 58*   ANION GAP mmol/L 6   CALCIUM mg/dL 8 2*   ALBUMIN g/dL 3 2*   TOTAL BILIRUBIN mg/dL 0 41   ALK PHOS U/L 38   ALT U/L 14   AST U/L 12*   GLUCOSE RANDOM mg/dL 114                 Results from last 7 days   Lab Units 05/09/23  0517 05/08/23  2153   LACTIC ACID mmol/L  --  0 6   PROCALCITONIN ng/ml 0 33* 0 34*           * I Have Reviewed All Lab Data Listed Above    * Additional Pertinent [facility] Tests [facility]ed: [facility]      Recent Cultures (last 7 days):     Results from last 7 days   Lab Units 05/08/23  2215 05/08/23  2153   BLOOD CULTURE  Received in Microbiology Lab  Culture in Progress  Received in Microbiology Lab  Culture in Progress  Last 24 Hours Medication List:   Current Facility-Administered Medications   Medication Dose Route Frequency Provider Last Rate   • acetaminophen  650 mg Oral Q6H PRN David Dominguez PA-C     • cefTRIAXone  1,000 mg Intravenous Q24H Brie Gonzalez PA-C     • enoxaparin  40 mg Subcutaneous Daily Brie Gonzalez PA-C     • lisinopril  5 mg Oral Daily Brie Gonzalez PA-C     • multi-electrolyte  75 mL/hr Intravenous Continuous Brie Gonzalez PA-C 75 mL/hr (05/09/23 0843)   • ondansetron  4 mg Intravenous Q6H PRN Brei Gonzalez PA-C     • oxybutynin  5 mg Oral TID David Dominguez PA-C     • potassium chloride  40 mEq Oral Once Billy Gifford MD     • saccharomyces boulardii  250 mg Oral BID Billy Gifford MD     • sertraline  100 mg Oral Daily David Dominguez PA-C          Today, Patient Was Seen By: Billy Gifford MD    ** Please Note: Dictation voice to text software may have been used in the creation of this document   **

## 2023-05-09 NOTE — UTILIZATION REVIEW
Initial Clinical Review    Admission: Date/Time/Statement:   Admission Orders (From admission, onward)     Ordered        05/08/23 2148  INPATIENT ADMISSION  Once                      Orders Placed This Encounter   Procedures   • INPATIENT ADMISSION     Standing Status:   Standing     Number of Occurrences:   1     Order Specific Question:   Level of Care     Answer:   Med Surg [16]     Order Specific Question:   Estimated length of stay     Answer:   More than 2 Midnights     Order Specific Question:   Certification     Answer:   I certify that inpatient services are medically necessary for this patient for a duration of greater than two midnights  See H&P and MD Progress Notes for additional information about the patient's course of treatment  ED Arrival Information     Expected   -    Arrival   5/8/2023 18:07    Acuity   Urgent            Means of arrival   Walk-In    Escorted by   Self    Service   Hospitalist    Admission type   Emergency            Arrival complaint   Flank Pain            Chief Complaint   Patient presents with   • Abdominal Pain     RUQ abdominal pain since Saturday, fever tmax 103 5, nausea and vomiting        Initial Presentation: 62 y o  female who presented self from home to [facility] ED  Inpatient admission for evaluation and treatment of sepsis  PMHx: HTN, depression  Presented w/ fever, chills, fatigue, dysuria, flank pain  Trial of PO ABX for UTI without improvement  On exam, tachycardic, b/l CVA tenderness, abdominal tenderness  Imaging consistent w/ pyelonephritis  WBC 18 79  Plan: IV ABX, follow urine cultures and blood cultures, Trend labs, replete electrolytes as needed; continue PTA meds  Date: 05/09/23   Day 2: Reports improving flank pain  Exam unremarkable  Plan: continue IV ABX, follow cultures, continue PTA meds, Trend labs, replete electrolytes as needed      ED Triage Vitals   Temperature Pulse Respirations Blood Pressure SpO2   05/08/23 1810 05/08/23 1810 05/08/23 1810 05/08/23 1810 05/08/23 1810   99 2 °F (37 3 °C) (!) 109 18 147/74 96 %      Temp Source Heart Rate Source Patient Position - Orthostatic VS BP Location FiO2 (%)   05/08/23 1810 05/08/23 1810 05/08/23 1810 05/08/23 1810 --   Oral Monitor Sitting Left arm       Pain Score       05/08/23 1906       6          Wt Readings from Last 1 Encounters:   05/08/23 78 9 kg (174 lb)     Additional Vital Signs:   Date/Time Temp Pulse Resp BP MAP (mmHg) SpO2 O2 Device   05/09/23 0749 99 6 °F (37 6 °C) 77 16 121/71 91 96 % None (Room air)   05/08/23 2320 -- 87 16 131/65 -- 98 % None (Room air)     Pertinent Labs/Diagnostic Test Results:   CT abdomen pelvis with contrast   Final Result by Pb Thibodeaux MD (05/08 2133)      Multiple hypoenhancing regions throughout both kidneys with perinephric stranding consistent with multifocal pyelonephritis  The study was marked in [facility] for immediate notification                 Workstation performed: [id number]               Results from last 7 days   Lab Units 05/09/23 0517 05/08/23 1938   WBC Thousand/uL 11 55* 18 79*   HEMOGLOBIN g/dL 10 4* 13 0   HEMATOCRIT % 32 0* 38 2   PLATELETS Thousands/uL 206 254   NEUTROS ABS Thousands/µL  --  16 17*         Results from last 7 days   Lab Units 05/09/23  0517 05/08/23 1938   SODIUM mmol/L 140 137   POTASSIUM mmol/L 3 2* 2 9*   CHLORIDE mmol/L 108 99   CO2 mmol/L 26 29   ANION GAP mmol/L 6 9   BUN mg/dL 8 14   CREATININE mg/dL 0 58* 0 77   EGFR ml/min/1 73sq m 102 85   CALCIUM mg/dL 8 2* 9 9     Results from last 7 days   Lab Units 05/09/23  0517 05/08/23  1938   AST U/L 12* 14   ALT U/L 14 18   ALK PHOS U/L 38 54   TOTAL PROTEIN g/dL 5 9* 7 7   ALBUMIN g/dL 3 2* 4 2   TOTAL BILIRUBIN mg/dL 0 41 0 68         Results from last 7 days   Lab Units 05/09/23  0517 05/08/23  1938   GLUCOSE RANDOM mg/dL 114 134       Results from last 7 days   Lab Units 05/09/23  0517 05/08/23  2153   PROCALCITONIN ng/ml 0 33* 0 34*     Results from last 7 days   Lab Units 05/08/23 2153   LACTIC ACID mmol/L 0 6     Results from last 7 days   Lab Units 05/08/23  1938   LIPASE u/L 7*     Results from last 7 days   Lab Units 05/08/23  1938 05/07/23  1146   CLARITY UA  Turbid clear   COLOR UA  Yellow yellow   SPEC GRAV UA  1 022  --    PH UA  6 0  --    GLUCOSE UA mg/dl Negative negative   KETONES UA mg/dl Negative negative   BLOOD UA  Trace* trace   PROTEIN UA mg/dl 70 (1+)* 100   NITRITE UA  Negative positive   BILIRUBIN UA  Negative  --    BILIRUBIN UA POC   --  negative   UROBILINOGEN UA   --  0 2   UROBILINOGEN UA (BE) mg/dl 2 0*  --    LEUKOCYTES UA  Moderate* large   WBC UA /hpf Innumerable*  --    RBC UA /hpf 2-4*  --    BACTERIA UA /hpf None Seen  --    EPITHELIAL CELLS WET PREP /hpf Occasional  --    MUCUS THREADS  Occasional*  --      Results from last 7 days   Lab Units 05/08/23 2215 05/08/23 2153   BLOOD CULTURE  Received in Microbiology Lab  Culture in Progress  Received in Microbiology Lab  Culture in Progress           ED Treatment:   Medication Administration from 05/08/2023 1807 to 05/09/2023 1246       Date/Time Order Dose Route Action     05/08/2023 1939 EDT sodium chloride 0 9 % bolus 1,000 mL 1,000 mL Intravenous New Bag     05/09/2023 0156 EDT potassium chloride 20 mEq IVPB (premix) 20 mEq Intravenous New Bag     05/08/2023 2050 EDT potassium chloride 20 mEq IVPB (premix) 20 mEq Intravenous New Bag     05/08/2023 2040 EDT iohexol (OMNIPAQUE) 350 MG/ML injection (SINGLE-DOSE) 100 mL 100 mL Intravenous Given     05/08/2023 2102 EDT ondansetron (ZOFRAN) injection 4 mg 4 mg Intravenous Given     05/08/2023 2150 EDT ceftriaxone (ROCEPHIN) 2 g/50 mL in dextrose IVPB 2,000 mg Intravenous New Bag     05/08/2023 2337 EDT sodium chloride 0 9 % bolus 1,000 mL 1,000 mL Intravenous New Bag     05/08/2023 2115 EDT sodium chloride 0 9 % bolus 400 mL 400 mL Intravenous New Bag     05/09/2023 0833 EDT lisinopril (ZESTRIL) tablet 5 mg 5 mg Oral Given 05/09/2023 9731 EDT sertraline (ZOLOFT) tablet 100 mg 100 mg Oral Given     05/09/2023 0833 EDT oxybutynin (DITROPAN) tablet 5 mg 5 mg Oral Given     05/08/2023 2333 EDT oxybutynin (DITROPAN) tablet 5 mg 5 mg Oral Given     05/08/2023 2333 EDT acetaminophen (TYLENOL) tablet 650 mg 650 mg Oral Given     05/09/2023 0833 EDT enoxaparin (LOVENOX) subcutaneous injection 40 mg 40 mg Subcutaneous Given     05/08/2023 2335 EDT ondansetron (ZOFRAN) injection 4 mg 4 mg Intravenous Given     05/09/2023 0843 EDT multi-electrolyte (PLASMALYTE-A/ISOLYTE-S PH 7 4) IV solution 75 mL/hr Intravenous Rate/ Dose Change     05/09/2023 0141 EDT multi-electrolyte (PLASMALYTE-A/ISOLYTE-S PH 7 4) IV solution 75 mL/hr Intravenous New Bag     05/09/2023 0833 EDT saccharomyces boulardii (FLORASTOR) capsule 250 mg 250 mg Oral Given     05/09/2023 0938 EDT potassium chloride (K-DUR,KLOR-CON) CR tablet 40 mEq 40 mEq Oral Given        History reviewed  No pertinent past medical history  Present on Admission:  • Pyelonephritis      Admitting Diagnosis: Abdominal pain [R10 9]  Age/Sex: 62 y o  female  Admission Orders:  Regular Diet  SCDs  Scheduled Medications:  cefTRIAXone, 1,000 mg, Intravenous, Q24H  enoxaparin, 40 mg, Subcutaneous, Daily  lisinopril, 5 mg, Oral, Daily  oxybutynin, 5 mg, Oral, TID  saccharomyces boulardii, 250 mg, Oral, BID  sertraline, 100 mg, Oral, Daily    Continuous IV Infusions:  multi-electrolyte, 75 mL/hr, Intravenous, Continuous    PRN Meds:  acetaminophen, 650 mg, Oral, Q6H PRN  ondansetron, 4 mg, Intravenous, Q6H PRN        Network Utilization Review Department  ATTENTION: Please call with any questions or concerns to [phone] and carefully listen to the prompts so that you are directed to the right person   All voicemails are confidential   Gauri Pleasure all requests for admission clinical reviews, approved or denied determinations and any other requests to dedicated fax number below belonging to the campus where the patient is receiving treatment   List of dedicated fax numbers for the Facilities:  [address] [place] [address] DENIALS (Administrative/Medical Necessity) [phone]   [address]  (Maternity/NICU/Pediatrics) [phone]   [address] [phone]   [address] [phone]   [address] [address] [address] [place] [address] [phone]   [facility] [address] [facility] [phone]

## 2023-05-09 NOTE — ASSESSMENT & PLAN NOTE
Patient presenting to the ED for fever/chills, fatigue, dysuria, flank pain  Patient was seen by urgent care a few days ago and prescribed ciprofloxacin for UTI  Due to persistent pain and vomiting patient returning to ED for evaluation  · Meeting sepsis criteria for leukocytosis and tachycardia + pyelo  · CT A/P: Multiple hypoenhancing regions throughout both kidneys with perinephric stranding consistent with multifocal pyelonephritis    · Started on IV ceftriaxone  · Follow up BC x2, lactic acid, procalcitonin, urine culture  · Trend WBC and fever curve

## 2023-05-09 NOTE — PLAN OF CARE
Problem: GASTROINTESTINAL - ADULT  Goal: Minimal or absence of nausea and/or vomiting  Description: INTERVENTIONS:  - Administer IV fluids if ordered to ensure adequate hydration  - Maintain NPO status until nausea and vomiting are resolved  - Nasogastric tube if ordered  - Administer ordered antiemetic medications as needed  - Provide nonpharmacologic comfort measures as appropriate  - Advance diet as tolerated, if ordered  - Consider nutrition services referral to assist patient with adequate nutrition and appropriate food choices  Outcome: Progressing  Goal: Maintains adequate nutritional intake  Description: INTERVENTIONS:  - Monitor percentage of each meal consumed  - Identify factors contributing to decreased intake, treat as appropriate  - Assist with meals as needed  - Monitor I&O, weight, and lab values if indicated  - Obtain nutrition services referral as needed  Outcome: Progressing  Goal: Maintains or returns to baseline bowel function  Description: INTERVENTIONS:  - Assess bowel function  - Encourage oral fluids to ensure adequate hydration  - Administer IV fluids if ordered to ensure adequate hydration  - Administer ordered medications as needed  - Encourage mobilization and activity  - Consider nutritional services referral to assist patient with adequate nutrition and appropriate food choices  Outcome: Progressing     Problem: GENITOURINARY - ADULT  Goal: Maintains or returns to baseline urinary function  Description: INTERVENTIONS:  - Assess urinary function  - Encourage oral fluids to ensure adequate hydration if ordered  - Administer IV fluids as ordered to ensure adequate hydration  - Administer ordered medications as needed  - Offer frequent toileting  - Follow urinary retention protocol if ordered  Outcome: Progressing  Goal: Absence of urinary retention  Description: INTERVENTIONS:  - Assess patient’s ability to void and empty bladder  - Monitor I/O  - Bladder scan as needed  - Discuss with physician/AP medications to alleviate retention as needed  - Discuss catheterization for long term situations as appropriate  Outcome: Progressing     Problem: METABOLIC, FLUID AND ELECTROLYTES - ADULT  Goal: Electrolytes maintained within normal limits  Description: INTERVENTIONS:  - Monitor labs and assess patient for signs and symptoms of electrolyte imbalances  - Administer electrolyte replacement as ordered  - Monitor response to electrolyte replacements, including repeat lab results as appropriate  - Instruct patient on fluid and nutrition as appropriate  Outcome: Progressing

## 2023-05-09 NOTE — ASSESSMENT & PLAN NOTE
· Noted on CT - Multiple hypoenhancing regions throughout both kidneys with perinephric stranding consistent with multifocal pyelonephritis     · This is causing sepsis as mentioned  · Continue ATBs

## 2023-05-09 NOTE — H&P
[address] [place] [address]  H&P  Name: Charlene Bright 62 y o  female I MRN: [id number]  Unit/Bed#: E0S8 I Date of Admission: 5/8/2023   Date of Service: 5/8/2023 I Hospital Day: 0      Assessment/Plan   * Sepsis [facility])  Assessment & Plan  Patient presenting to the ED for fever/chills, fatigue, dysuria, flank pain  Patient was seen by urgent care a few days ago and prescribed ciprofloxacin for UTI  Due to persistent pain and vomiting patient returning to ED for evaluation  · Meeting sepsis criteria for leukocytosis and tachycardia + pyelo  · CT A/P: Multiple hypoenhancing regions throughout both kidneys with perinephric stranding consistent with multifocal pyelonephritis  · Started on IV ceftriaxone  · Follow up BC x2, lactic acid, procalcitonin, urine culture  · Trend WBC and fever curve    Depression  Assessment & Plan  · Mood stable  · Continue home Zoloft    Hypertension  Assessment & Plan  · BP stable on admission  · Continue home lisinopril  · Monitor vitals per routine         VTE Prophylaxis: Enoxaparin (Lovenox)  / sequential compression device   Code Status: Level 1 code  Discussion with family: Update in the AM    Anticipated Length of Stay:  Patient will be admitted on an Inpatient basis with an anticipated length of stay of  More than 2 midnights  Justification for Hospital Stay: Sepsis    Total Time for Visit, including Counseling / Coordination of Care: 90 minutes  Greater than 50% of this total time spent on direct patient counseling and coordination of care  Chief Complaint:   Vomiting    History of Present Illness:    Charlene Bright is a 62 y o  female who has a past medical history significant for hypertension and depression  Patient presenting to the ED for fever/chills, fatigue, dysuria, flank pain  Patient was seen by urgent care a few days ago and prescribed ciprofloxacin for UTI  Due to persistent pain and vomiting patient returning to ED for evaluation    Patient require medical admission for sepsis secondary to bilateral pyelonephritis  All patient questions answered to the best of my ability  Review of Systems:    Review of Systems   Constitutional: Positive for chills, fatigue and fever  HENT: Negative for ear pain and sore throat  Eyes: Negative for pain and visual disturbance  Respiratory: Negative for cough and shortness of breath  Cardiovascular: Negative for chest pain and palpitations  Gastrointestinal: Positive for abdominal pain and vomiting  Genitourinary: Positive for flank pain  Negative for dysuria and hematuria  Musculoskeletal: Negative for arthralgias and back pain  Skin: Negative for color change and rash  Neurological: Negative for seizures and syncope  All other systems reviewed and are negative  Past Medical and Surgical History:     History reviewed  No pertinent past medical history  History reviewed  No pertinent surgical history  Meds/Allergies:    Prior to Admission medications    Medication Sig Start Date End Date Taking? Authorizing Provider   ciprofloxacin (CIPRO) 500 mg tablet Take 1 tablet (500 mg total) by mouth every 12 (twelve) hours for 7 days 5/7/23 5/14/23  JOSH Camarillo   lisinopril (ZESTRIL) 10 mg tablet Take 5 mg by mouth daily 4/19/23   Historical Provider, MD   ondansetron (ZOFRAN) 4 mg tablet Take 1 tablet (4 mg total) by mouth every 8 (eight) hours as needed for nausea or vomiting for up to 7 days 5/7/23 5/14/23  JOSH Camarillo   sertraline (ZOLOFT) 100 mg tablet Take 100 mg by mouth daily 4/19/23   Historical Provider, MD     I have reviewed home medications using allscripts  Allergies:    Allergies   Allergen Reactions   • Gluten Meal - Food Allergy Nausea Only       Social History:     Marital Status:    Occupation: NA  Patient Pre-hospital Living Situation: Home  Patient Pre-hospital Level of Mobility: Walks  Patient Pre-hospital Diet Restrictions: None  Substance Use "History:   Social History     Substance and Sexual Activity   Alcohol Use Not Currently     Social History     Tobacco Use   Smoking Status Never   Smokeless Tobacco Never     Social History     Substance and Sexual Activity   Drug Use Not Currently       Family History:    History reviewed  No pertinent family history  Physical Exam:     Vitals:   Blood Pressure: 147/74 (05/08/23 1810)  Pulse: (!) 109 (05/08/23 1810)  Temperature: 99 2 °F (37 3 °C) (05/08/23 1810)  Temp Source: Oral (05/08/23 1810)  Respirations: 18 (05/08/23 1810)  Height: 5' 4\" (162 6 cm) (05/08/23 1810)  Weight - Scale: 78 9 kg (174 lb) (05/08/23 1810)  SpO2: 96 % (05/08/23 1810)    Physical Exam  Vitals and nursing note reviewed  Constitutional:       General: She is not in acute distress  Appearance: She is well-developed  HENT:      Head: Normocephalic and atraumatic  Eyes:      Conjunctiva/sclera: Conjunctivae normal    Cardiovascular:      Rate and Rhythm: Regular rhythm  Tachycardia present  Heart sounds: No murmur heard  Pulmonary:      Effort: Pulmonary effort is normal  No respiratory distress  Breath sounds: Normal breath sounds  Abdominal:      Palpations: Abdomen is soft  Tenderness: There is abdominal tenderness  There is right CVA tenderness and left CVA tenderness  Musculoskeletal:         General: No swelling  Cervical back: Neck supple  Skin:     General: Skin is warm and dry  Capillary Refill: Capillary refill takes less than 2 seconds  Neurological:      Mental Status: She is alert  Psychiatric:         Mood and Affect: Mood normal          Additional Data:     Lab Results: I have personally reviewed pertinent reports        Results from last 7 days   Lab Units 05/08/23  1938   WBC Thousand/uL 18 79*   HEMOGLOBIN g/dL 13 0   HEMATOCRIT % 38 2   PLATELETS Thousands/uL 254   NEUTROS PCT % 86*   LYMPHS PCT % 5*   MONOS PCT % 8   EOS PCT % 0     Results from last 7 days   Lab Units " 05/08/23  1938   SODIUM mmol/L 137   POTASSIUM mmol/L 2 9*   CHLORIDE mmol/L 99   CO2 mmol/L 29   BUN mg/dL 14   CREATININE mg/dL 0 77   ANION GAP mmol/L 9   CALCIUM mg/dL 9 9   ALBUMIN g/dL 4 2   TOTAL BILIRUBIN mg/dL 0 68   ALK PHOS U/L 54   ALT U/L 18   AST U/L 14   GLUCOSE RANDOM mg/dL 134                       Imaging: I have personally reviewed pertinent reports  CT abdomen pelvis with contrast   Final Result by Ethan Collins MD (05/08 2133)      Multiple hypoenhancing regions throughout both kidneys with perinephric stranding consistent with multifocal pyelonephritis  The study was marked in [facility] for immediate notification  Workstation performed: [id number]             EKG, Pathology, and Other Studies Reviewed on Admission:   · EKG: None obtained    Allscripts / Epic Records Reviewed: Yes     ** Please Note: This note has been constructed using a voice recognition system   **

## 2023-05-09 NOTE — ASSESSMENT & PLAN NOTE
Patient presenting to the ED for fever/chills, fatigue, dysuria, flank pain  Patient was seen by urgent care a few days ago and prescribed ciprofloxacin for UTI  Due to persistent pain and vomiting patient returning to ED for evaluation  · Meeting sepsis criteria for leukocytosis and tachycardia + pyelo  · CT A/P: Multiple hypoenhancing regions throughout both kidneys with perinephric stranding consistent with multifocal pyelonephritis      · Continue ceftriaxone  · Follow cultures

## 2023-05-10 LAB
ANION GAP SERPL CALCULATED.3IONS-SCNC: 5 MMOL/L (ref 4–13)
BACTERIA UR CULT: ABNORMAL
BACTERIA UR CULT: NORMAL
BASOPHILS # BLD AUTO: 0.03 THOUSANDS/ÂΜL (ref 0–0.1)
BASOPHILS NFR BLD AUTO: 0 % (ref 0–1)
BUN SERPL-MCNC: 7 MG/DL (ref 5–25)
CALCIUM SERPL-MCNC: 8.1 MG/DL (ref 8.4–10.2)
CHLORIDE SERPL-SCNC: 107 MMOL/L (ref 96–108)
CO2 SERPL-SCNC: 28 MMOL/L (ref 21–32)
CREAT SERPL-MCNC: 0.52 MG/DL (ref 0.6–1.3)
EOSINOPHIL # BLD AUTO: 0.2 THOUSAND/ÂΜL (ref 0–0.61)
EOSINOPHIL NFR BLD AUTO: 3 % (ref 0–6)
ERYTHROCYTE [DISTWIDTH] IN BLOOD BY AUTOMATED COUNT: 13.2 % (ref 11.6–15.1)
GFR SERPL CREATININE-BSD FRML MDRD: 106 ML/MIN/1.73SQ M
GLUCOSE SERPL-MCNC: 104 MG/DL (ref 65–140)
HCT VFR BLD AUTO: 31.2 % (ref 34.8–46.1)
HGB BLD-MCNC: 10.2 G/DL (ref 11.5–15.4)
IMM GRANULOCYTES # BLD AUTO: 0.02 THOUSAND/UL (ref 0–0.2)
IMM GRANULOCYTES NFR BLD AUTO: 0 % (ref 0–2)
LYMPHOCYTES # BLD AUTO: 1.27 THOUSANDS/ÂΜL (ref 0.6–4.47)
LYMPHOCYTES NFR BLD AUTO: 19 % (ref 14–44)
MCH RBC QN AUTO: 27.5 PG (ref 26.8–34.3)
MCHC RBC AUTO-ENTMCNC: 32.7 G/DL (ref 31.4–37.4)
MCV RBC AUTO: 84 FL (ref 82–98)
MONOCYTES # BLD AUTO: 0.61 THOUSAND/ÂΜL (ref 0.17–1.22)
MONOCYTES NFR BLD AUTO: 9 % (ref 4–12)
NEUTROPHILS # BLD AUTO: 4.72 THOUSANDS/ÂΜL (ref 1.85–7.62)
NEUTS SEG NFR BLD AUTO: 69 % (ref 43–75)
NRBC BLD AUTO-RTO: 0 /100 WBCS
PLATELET # BLD AUTO: 208 THOUSANDS/UL (ref 149–390)
PMV BLD AUTO: 10.6 FL (ref 8.9–12.7)
POTASSIUM SERPL-SCNC: 3.2 MMOL/L (ref 3.5–5.3)
RBC # BLD AUTO: 3.71 MILLION/UL (ref 3.81–5.12)
SODIUM SERPL-SCNC: 140 MMOL/L (ref 135–147)
WBC # BLD AUTO: 6.85 THOUSAND/UL (ref 4.31–10.16)

## 2023-05-10 RX ORDER — ALPRAZOLAM 0.5 MG/1
0.5 TABLET ORAL 2 TIMES DAILY PRN
Status: DISCONTINUED | OUTPATIENT
Start: 2023-05-10 | End: 2023-05-11 | Stop reason: HOSPADM

## 2023-05-10 RX ORDER — POTASSIUM CHLORIDE 20 MEQ/1
40 TABLET, EXTENDED RELEASE ORAL ONCE
Status: COMPLETED | OUTPATIENT
Start: 2023-05-10 | End: 2023-05-10

## 2023-05-10 RX ADMIN — OXYBUTYNIN CHLORIDE 5 MG: 5 TABLET ORAL at 09:22

## 2023-05-10 RX ADMIN — ALPRAZOLAM 0.5 MG: 0.5 TABLET ORAL at 16:28

## 2023-05-10 RX ADMIN — POTASSIUM CHLORIDE 40 MEQ: 1500 TABLET, EXTENDED RELEASE ORAL at 10:44

## 2023-05-10 RX ADMIN — ENOXAPARIN SODIUM 40 MG: 40 INJECTION SUBCUTANEOUS at 09:22

## 2023-05-10 RX ADMIN — CEFTRIAXONE SODIUM 1000 MG: 10 INJECTION, POWDER, FOR SOLUTION INTRAVENOUS at 21:39

## 2023-05-10 RX ADMIN — LISINOPRIL 5 MG: 5 TABLET ORAL at 09:22

## 2023-05-10 RX ADMIN — OXYBUTYNIN CHLORIDE 5 MG: 5 TABLET ORAL at 16:28

## 2023-05-10 RX ADMIN — SERTRALINE HYDROCHLORIDE 100 MG: 100 TABLET ORAL at 09:22

## 2023-05-10 RX ADMIN — OXYBUTYNIN CHLORIDE 5 MG: 5 TABLET ORAL at 21:39

## 2023-05-10 RX ADMIN — Medication 250 MG: at 16:28

## 2023-05-10 RX ADMIN — Medication 250 MG: at 09:22

## 2023-05-10 RX ADMIN — SODIUM CHLORIDE, SODIUM GLUCONATE, SODIUM ACETATE, POTASSIUM CHLORIDE AND MAGNESIUM CHLORIDE 75 ML/HR: 526; 502; 368; 37; 30 INJECTION, SOLUTION INTRAVENOUS at 10:44

## 2023-05-10 NOTE — PROGRESS NOTES
[address] [place] [address]  Progress Note  Name: Radha Matthews  MRN: [id number]  Unit/Bed#: -01 I Date of Admission: 5/8/2023   Date of Service: 5/10/2023 I Hospital Day: 2    Assessment/Plan   Pyelonephritis  Assessment & Plan  · Noted on CT - Multiple hypoenhancing regions throughout both kidneys with perinephric stranding consistent with multifocal pyelonephritis  · This is causing sepsis as mentioned  · Continue ATBs    * Sepsis [facility])  Assessment & Plan  Patient presenting to the ED for fever/chills, fatigue, dysuria, flank pain  Patient was seen by urgent care a few days ago and prescribed ciprofloxacin for UTI  Due to persistent pain and vomiting patient returning to ED for evaluation  · Meeting sepsis criteria for leukocytosis and tachycardia + pyelo  · CT A/P: Multiple hypoenhancing regions throughout both kidneys with perinephric stranding consistent with multifocal pyelonephritis  · Continue ceftriaxone  · Urine culture growing E  coli, follow final results/sensitivities  · Anticipate transition to p o  alternative in the next 24 hours if clinical improvement  · The patient will need a work note at the time of discharge    Depression  Assessment & Plan  · Mood stable  · Continue home Zoloft  · Continue to monitor    Hypertension  Assessment & Plan  · Pressure currently acceptable  · Continue home lisinopril  · Monitor blood pressure         VTE Pharmacologic Prophylaxis:   Pharmacologic: Enoxaparin (Lovenox)  Mechanical VTE Prophylaxis in Place: Yes    Patient Centered Rounds: I have performed bedside rounds with nursing staff today  Discussions with Specialists or Other Care Team Provider: Discussed with care management team    Education and Discussions with Family / Patient: Patient, she did not ask me to talk to anyone    Time Spent for Care: 1 hour  More than 50% of total time spent on counseling and coordination of care as described above      Current Length of Stay: 2 day(s)    Current Patient Status: Inpatient   Certification Statement: The patient will continue to require additional inpatient hospital stay due to need for IV antibiotics    Discharge Plan: Once stable - hopefully 24h    Code Status: Level 1 - Full Code      Subjective:     Patient evaluated this morning  Continues to complain of RUQ/flank abdominal pain but better compared to yesterday  Denies n/v/d/c    Objective:     Vitals:   Temp (24hrs), Av 5 °F (36 9 °C), Min:98 °F (36 7 °C), Max:99 4 °F (37 4 °C)    Temp:  [98 °F (36 7 °C)-99 4 °F (37 4 °C)] 98 1 °F (36 7 °C)  HR:  [66-85] 66  Resp:  [16-18] 18  BP: ()/(51-69) 131/68  SpO2:  [96 %] 96 %  Body mass index is 29 87 kg/m²  Input and Output Summary (last 24 hours): Intake/Output Summary (Last 24 hours) at 5/10/2023 1117  Last data filed at 5/10/2023 0801  Gross per 24 hour   Intake 420 ml   Output --   Net 420 ml       Physical Exam:     Physical Exam  Vitals and nursing note reviewed  Constitutional:       Appearance: Normal appearance  She is normal weight  Comments: Female in bed, awake   HENT:      Head: Normocephalic and atraumatic  Right Ear: External ear normal       Left Ear: External ear normal       Nose: Nose normal  No congestion  Mouth/Throat:      Mouth: Mucous membranes are moist       Pharynx: Oropharynx is clear  No oropharyngeal exudate or posterior oropharyngeal erythema  Eyes:      General: No scleral icterus  Right eye: No discharge  Left eye: No discharge  Extraocular Movements: Extraocular movements intact  Conjunctiva/sclera: Conjunctivae normal       Pupils: Pupils are equal, round, and reactive to light  Cardiovascular:      Rate and Rhythm: Normal rate and regular rhythm  Pulses: Normal pulses  Heart sounds: Normal heart sounds  No murmur heard  No friction rub  No gallop  Pulmonary:      Effort: Pulmonary effort is normal  No respiratory distress  Breath sounds: Normal breath sounds  No stridor  No wheezing, rhonchi or rales  Chest:      Chest wall: No tenderness  Abdominal:      General: Abdomen is flat  Bowel sounds are normal  There is no distension  Palpations: Abdomen is soft  There is no mass  Tenderness: There is no abdominal tenderness  There is no guarding or rebound  Musculoskeletal:         General: No swelling, tenderness, deformity or signs of injury  Normal range of motion  Cervical back: Normal range of motion and neck supple  No rigidity  No muscular tenderness  Skin:     General: Skin is warm and dry  Capillary Refill: Capillary refill takes less than 2 seconds  Coloration: Skin is not jaundiced or pale  Findings: No bruising, erythema, lesion or rash  Neurological:      General: No focal deficit present  Mental Status: She is alert and oriented to person, place, and time  Mental status is at baseline  Cranial Nerves: No cranial nerve deficit  Sensory: No sensory deficit  Motor: No weakness  Coordination: Coordination normal    Psychiatric:         Mood and Affect: Mood normal          Behavior: Behavior normal          Thought Content:  Thought content normal          Judgment: Judgment normal            Additional Data:     Labs:    Results from last 7 days   Lab Units 05/10/23  0650   WBC Thousand/uL 6 85   HEMOGLOBIN g/dL 10 2*   HEMATOCRIT % 31 2*   PLATELETS Thousands/uL 208   NEUTROS PCT % 69   LYMPHS PCT % 19   MONOS PCT % 9   EOS PCT % 3     Results from last 7 days   Lab Units 05/10/23  0650 05/09/23  0517   SODIUM mmol/L 140 140   POTASSIUM mmol/L 3 2* 3 2*   CHLORIDE mmol/L 107 108   CO2 mmol/L 28 26   BUN mg/dL 7 8   CREATININE mg/dL 0 52* 0 58*   ANION GAP mmol/L 5 6   CALCIUM mg/dL 8 1* 8 2*   ALBUMIN g/dL  --  3 2*   TOTAL BILIRUBIN mg/dL  --  0 41   ALK PHOS U/L  --  38   ALT U/L  --  14   AST U/L  --  12*   GLUCOSE RANDOM mg/dL 104 114                 Results from last 7 days   Lab Units 05/09/23  0517 05/08/23  2153   LACTIC ACID mmol/L  --  0 6   PROCALCITONIN ng/ml 0 33* 0 34*           * I Have Reviewed All Lab Data Listed Above  * Additional Pertinent Lab Tests Reviewed: Karo 66 Admission Reviewed    Recent Cultures (last 7 days):     Results from last 7 days   Lab Units 05/08/23  2215 05/08/23  2153 05/08/23  1938 05/07/23  1246   BLOOD CULTURE  No Growth at 24 hrs  No Growth at 24 hrs   --   --    URINE CULTURE   --   --  No Growth <1000 cfu/mL >100,000 cfu/ml Escherichia coli*       Last 24 Hours Medication List:   Current Facility-Administered Medications   Medication Dose Route Frequency Provider Last Rate   • acetaminophen  650 mg Oral Q6H PRN Bimal Burgos PA-C     • ALPRAZolam  0 5 mg Oral BID PRN Babs Santamaria MD     • cefTRIAXone  1,000 mg Intravenous Q24H Bimal Burgos PA-C 1,000 mg (05/09/23 2100)   • enoxaparin  40 mg Subcutaneous Daily Brie Gonzalez PA-C     • HYDROmorphone  0 5 mg Intravenous Q6H PRN Brie Gonzalez PA-C     • lisinopril  5 mg Oral Daily Brie Gonzalez PA-C     • multi-electrolyte  75 mL/hr Intravenous Continuous Brie Gonzalez PA-C 75 mL/hr (05/10/23 1044)   • ondansetron  4 mg Intravenous Q6H PRN Brie Gonzalez PA-C     • oxybutynin  5 mg Oral TID Bimal Burgos PA-C     • saccharomyces boulardii  250 mg Oral BID Babs Santamaria MD     • sertraline  100 mg Oral Daily Bimal Burgos PA-C          Today, Patient Was Seen By: Babs Santamaria MD    ** Please Note: Dictation voice to text software may have been used in the creation of this document   **

## 2023-05-10 NOTE — ASSESSMENT & PLAN NOTE
Patient presenting to the ED for fever/chills, fatigue, dysuria, flank pain  Patient was seen by urgent care a few days ago and prescribed ciprofloxacin for UTI  Due to persistent pain and vomiting patient returning to ED for evaluation  · Meeting sepsis criteria for leukocytosis and tachycardia + pyelo  · CT A/P: Multiple hypoenhancing regions throughout both kidneys with perinephric stranding consistent with multifocal pyelonephritis      · Continue ceftriaxone  · Urine culture growing E  coli, follow final results/sensitivities  · Anticipate transition to p o  alternative in the next 24 hours if clinical improvement  · The patient will need a work note at the time of discharge

## 2023-05-11 VITALS
HEART RATE: 95 BPM | OXYGEN SATURATION: 91 % | BODY MASS INDEX: 29.71 KG/M2 | TEMPERATURE: 97.6 F | HEIGHT: 64 IN | RESPIRATION RATE: 18 BRPM | WEIGHT: 174 LBS | SYSTOLIC BLOOD PRESSURE: 136 MMHG | DIASTOLIC BLOOD PRESSURE: 82 MMHG

## 2023-05-11 LAB
ANION GAP SERPL CALCULATED.3IONS-SCNC: 6 MMOL/L (ref 4–13)
BASOPHILS # BLD AUTO: 0.03 THOUSANDS/ÂΜL (ref 0–0.1)
BASOPHILS NFR BLD AUTO: 1 % (ref 0–1)
BUN SERPL-MCNC: 6 MG/DL (ref 5–25)
CALCIUM SERPL-MCNC: 8.2 MG/DL (ref 8.4–10.2)
CHLORIDE SERPL-SCNC: 109 MMOL/L (ref 96–108)
CO2 SERPL-SCNC: 27 MMOL/L (ref 21–32)
CREAT SERPL-MCNC: 0.47 MG/DL (ref 0.6–1.3)
EOSINOPHIL # BLD AUTO: 0.24 THOUSAND/ÂΜL (ref 0–0.61)
EOSINOPHIL NFR BLD AUTO: 4 % (ref 0–6)
ERYTHROCYTE [DISTWIDTH] IN BLOOD BY AUTOMATED COUNT: 13.2 % (ref 11.6–15.1)
GFR SERPL CREATININE-BSD FRML MDRD: 109 ML/MIN/1.73SQ M
GLUCOSE SERPL-MCNC: 101 MG/DL (ref 65–140)
HCT VFR BLD AUTO: 30.6 % (ref 34.8–46.1)
HGB BLD-MCNC: 10.1 G/DL (ref 11.5–15.4)
IMM GRANULOCYTES # BLD AUTO: 0.02 THOUSAND/UL (ref 0–0.2)
IMM GRANULOCYTES NFR BLD AUTO: 0 % (ref 0–2)
LYMPHOCYTES # BLD AUTO: 1.26 THOUSANDS/ÂΜL (ref 0.6–4.47)
LYMPHOCYTES NFR BLD AUTO: 19 % (ref 14–44)
MCH RBC QN AUTO: 28 PG (ref 26.8–34.3)
MCHC RBC AUTO-ENTMCNC: 33 G/DL (ref 31.4–37.4)
MCV RBC AUTO: 85 FL (ref 82–98)
MONOCYTES # BLD AUTO: 0.43 THOUSAND/ÂΜL (ref 0.17–1.22)
MONOCYTES NFR BLD AUTO: 7 % (ref 4–12)
NEUTROPHILS # BLD AUTO: 4.62 THOUSANDS/ÂΜL (ref 1.85–7.62)
NEUTS SEG NFR BLD AUTO: 69 % (ref 43–75)
NRBC BLD AUTO-RTO: 0 /100 WBCS
PLATELET # BLD AUTO: 234 THOUSANDS/UL (ref 149–390)
PMV BLD AUTO: 10.5 FL (ref 8.9–12.7)
POTASSIUM SERPL-SCNC: 3.7 MMOL/L (ref 3.5–5.3)
RBC # BLD AUTO: 3.61 MILLION/UL (ref 3.81–5.12)
SODIUM SERPL-SCNC: 142 MMOL/L (ref 135–147)
WBC # BLD AUTO: 6.6 THOUSAND/UL (ref 4.31–10.16)

## 2023-05-11 RX ORDER — SACCHAROMYCES BOULARDII 250 MG
250 CAPSULE ORAL 2 TIMES DAILY
Qty: 60 CAPSULE | Refills: 0 | Status: SHIPPED | OUTPATIENT
Start: 2023-05-11

## 2023-05-11 RX ORDER — CEFPODOXIME PROXETIL 200 MG/1
200 TABLET, FILM COATED ORAL 2 TIMES DAILY
Qty: 14 TABLET | Refills: 0 | Status: SHIPPED | OUTPATIENT
Start: 2023-05-11 | End: 2023-05-18

## 2023-05-11 RX ADMIN — LISINOPRIL 5 MG: 5 TABLET ORAL at 09:21

## 2023-05-11 RX ADMIN — SERTRALINE HYDROCHLORIDE 100 MG: 100 TABLET ORAL at 09:21

## 2023-05-11 RX ADMIN — ENOXAPARIN SODIUM 40 MG: 40 INJECTION SUBCUTANEOUS at 09:21

## 2023-05-11 RX ADMIN — Medication 250 MG: at 09:21

## 2023-05-11 RX ADMIN — SODIUM CHLORIDE, SODIUM GLUCONATE, SODIUM ACETATE, POTASSIUM CHLORIDE AND MAGNESIUM CHLORIDE 75 ML/HR: 526; 502; 368; 37; 30 INJECTION, SOLUTION INTRAVENOUS at 00:13

## 2023-05-11 RX ADMIN — OXYBUTYNIN CHLORIDE 5 MG: 5 TABLET ORAL at 09:21

## 2023-05-11 NOTE — DISCHARGE SUMMARY
[address] [facility]  Discharge- Molly Enriquez 1965, 62 y o  female MRN: [id number]  Unit/Bed#: -01 Encounter: [id number]  Primary Care Provider: No primary care provider on file  Date and time admitted to hospital: 5/8/2023  7:19 PM    Discharge diagnosis:    Multifocal pyelonephritis  Sepsis  Depression  Hypertension  Anemia, likely dilutional    Discharging Physician / Practitioner: Nhung Hayward MD  PCP: No primary care provider on file  Admission Date:   Admission Orders (From admission, onward)     Ordered        05/08/23 2148  INPATIENT ADMISSION  Once                      Discharge Date: 05/11/23     Outpatient follow-up requested:  · Primary care physician    Complications:  None    Reason for Admission: Pyelonephritis    HPI:  Molly Enriquez is a 62 y o  female who has a past medical history significant for hypertension and depression  Patient presenting to the ED for fever/chills, fatigue, dysuria, flank pain  Patient was seen by urgent care a few days ago and prescribed ciprofloxacin for UTI  Due to persistent pain and vomiting patient returning to ED for evaluation  Patient require medical admission for sepsis secondary to bilateral pyelonephritis  All patient questions answered to the best of my ability  Hospital Course: The patient was hospitalized with pyelonephritis and sepsis  She was a started on ceftriaxone  She did have significant clinical improvement with improvement in pain  Patient does not have any nausea or vomiting and is tolerating p o  intake  The patient's urine culture is growing pansensitive E  Coli  I am discharging patient on a course of cefpodoxime  She is to follow-up with primary care physician  Condition at Discharge: good     Discharge Day Visit / Exam:     Subjective:    Patient valuated this morning  Denies any chest pain nausea vomiting  Tolerating antibiotic therapy without any issues    Wishes to go "home   Vitals: Blood Pressure: 136/82 (05/11/23 0921)  Pulse: 95 (05/11/23 0700)  Temperature: 97 6 °F (36 4 °C) (05/11/23 0700)  Temp Source: Oral (05/10/23 1500)  Respirations: 18 (05/11/23 0700)  Height: 5' 4\" (162 6 cm) (05/08/23 1810)  Weight - Scale: 78 9 kg (174 lb) (05/08/23 1810)  SpO2: 91 % (05/11/23 0700)     Exam:   Physical Exam  Vitals and nursing note reviewed  Constitutional:       Appearance: Normal appearance  She is normal weight  Comments: Female in bed, awake   HENT:      Head: Normocephalic and atraumatic  Right Ear: External ear normal       Left Ear: External ear normal       Nose: Nose normal  No congestion  Mouth/Throat:      Mouth: Mucous membranes are moist       Pharynx: Oropharynx is clear  No oropharyngeal exudate or posterior oropharyngeal erythema  Eyes:      General: No scleral icterus  Right eye: No discharge  Left eye: No discharge  Extraocular Movements: Extraocular movements intact  Conjunctiva/sclera: Conjunctivae normal       Pupils: Pupils are equal, round, and reactive to light  Cardiovascular:      Rate and Rhythm: Normal rate and regular rhythm  Pulses: Normal pulses  Heart sounds: Normal heart sounds  No murmur heard  No friction rub  No gallop  Pulmonary:      Effort: Pulmonary effort is normal  No respiratory distress  Breath sounds: Normal breath sounds  No stridor  No wheezing, rhonchi or rales  Chest:      Chest wall: No tenderness  Abdominal:      General: Abdomen is flat  Bowel sounds are normal  There is no distension  Palpations: Abdomen is soft  There is no mass  Tenderness: There is no abdominal tenderness  There is no guarding or rebound  Musculoskeletal:         General: No swelling, tenderness, deformity or signs of injury  Normal range of motion  Cervical back: Normal range of motion and neck supple  No rigidity  No muscular tenderness     Skin:     General: Skin is warm " and dry  Capillary Refill: Capillary refill takes less than 2 seconds  Coloration: Skin is not jaundiced or pale  Findings: No bruising, erythema, lesion or rash  Neurological:      General: No focal deficit present  Mental Status: She is alert and oriented to person, place, and time  Mental status is at baseline  Cranial Nerves: No cranial nerve deficit  Sensory: No sensory deficit  Motor: No weakness  Coordination: Coordination normal    Psychiatric:         Mood and Affect: Mood normal          Behavior: Behavior normal          Thought Content: Thought content normal          Judgment: Judgment normal            Discussion with Family: Patient herself, she did not ask me to talk to anyone    Discharge instructions/Information to patient and family:   See after visit summary for information provided to patient and family  Provisions for Follow-Up Care:  See after visit summary for information related to follow-up care and any pertinent home health orders  Disposition:     Home    For Discharges to [facility]F:   · Not Applicable to this Patient - Not Applicable to this Patient    Planned Readmission: No     Discharge Statement:  I spent 96 minutes discharging the patient  This time was spent on the day of discharge  I had direct contact with the patient on the day of discharge  Greater than 50% of the total time was spent examining patient, answering all patient questions, arranging and discussing plan of care with patient as well as directly providing post-discharge instructions  Additional time then spent on discharge activities  Discharge Medications:  See after visit summary for reconciled discharge medications provided to patient and family        ** Please Note: This note has been constructed using a voice recognition system **

## 2023-05-14 LAB
BACTERIA BLD CULT: NORMAL
BACTERIA BLD CULT: NORMAL

## 2023-05-23 ENCOUNTER — OFFICE VISIT (OUTPATIENT)
Age: 58
End: 2023-05-23

## 2023-05-23 VITALS
HEART RATE: 72 BPM | HEIGHT: 64 IN | BODY MASS INDEX: 29.37 KG/M2 | OXYGEN SATURATION: 98 % | SYSTOLIC BLOOD PRESSURE: 118 MMHG | DIASTOLIC BLOOD PRESSURE: 86 MMHG | WEIGHT: 172 LBS

## 2023-05-23 DIAGNOSIS — R73.09 ELEVATED RANDOM BLOOD GLUCOSE LEVEL: ICD-10-CM

## 2023-05-23 DIAGNOSIS — I10 PRIMARY HYPERTENSION: ICD-10-CM

## 2023-05-23 DIAGNOSIS — Z12.31 ENCOUNTER FOR SCREENING MAMMOGRAM FOR MALIGNANT NEOPLASM OF BREAST: ICD-10-CM

## 2023-05-23 DIAGNOSIS — A41.9 SEPSIS, DUE TO UNSPECIFIED ORGANISM, UNSPECIFIED WHETHER ACUTE ORGAN DYSFUNCTION PRESENT (HCC): Primary | ICD-10-CM

## 2023-05-23 DIAGNOSIS — G35 MULTIPLE SCLEROSIS (HCC): ICD-10-CM

## 2023-05-23 DIAGNOSIS — Z13.220 ENCOUNTER FOR LIPID SCREENING FOR CARDIOVASCULAR DISEASE: ICD-10-CM

## 2023-05-23 DIAGNOSIS — N12 PYELONEPHRITIS: ICD-10-CM

## 2023-05-23 DIAGNOSIS — Z13.0 SCREENING FOR IRON DEFICIENCY ANEMIA: ICD-10-CM

## 2023-05-23 DIAGNOSIS — Z13.6 ENCOUNTER FOR LIPID SCREENING FOR CARDIOVASCULAR DISEASE: ICD-10-CM

## 2023-05-23 DIAGNOSIS — F32.A DEPRESSION, UNSPECIFIED DEPRESSION TYPE: ICD-10-CM

## 2023-05-23 DIAGNOSIS — F41.9 ANXIETY: ICD-10-CM

## 2023-05-23 DIAGNOSIS — Z01.419 ENCOUNTER FOR CERVICAL PAP SMEAR WITH PELVIC EXAM: ICD-10-CM

## 2023-05-23 DIAGNOSIS — Z12.11 ENCOUNTER FOR SCREENING FOR MALIGNANT NEOPLASM OF COLON: ICD-10-CM

## 2023-05-23 DIAGNOSIS — Z13.29 SCREENING FOR THYROID DISORDER: ICD-10-CM

## 2023-05-23 RX ORDER — OXYBUTYNIN CHLORIDE 5 MG/1
5 TABLET ORAL 3 TIMES DAILY
COMMUNITY

## 2023-05-23 RX ORDER — HYDROXYZINE HYDROCHLORIDE 25 MG/1
25 TABLET, FILM COATED ORAL EVERY 6 HOURS PRN
Qty: 30 TABLET | Refills: 3 | Status: SHIPPED | OUTPATIENT
Start: 2023-05-23

## 2023-05-23 RX ORDER — SERTRALINE HYDROCHLORIDE 100 MG/1
150 TABLET, FILM COATED ORAL DAILY
Qty: 180 TABLET | Refills: 1 | Status: SHIPPED | OUTPATIENT
Start: 2023-05-23

## 2023-05-23 NOTE — PROGRESS NOTES
INTERNAL MEDICINE FOLLOW-UP VISIT  [facility] - [facility] PRIMARY [facility] [place]    NAME: Phyllis Baez  AGE: 62 y o  SEX: female  : 1965     DATE: 2023     Assessment and Plan:   Sepsis, due to unspecified organism, unspecified whether acute organ dysfunction present (HCC)/pylonephritis   Hosp /Resolved, no longer on antibiotics  No longer has flank pain    Primary hypertension  Stable with use of lisinopril 5 mg daily  Limit salt intake to no more than 2000 mg per day  Read food labels    Depression, unspecified depression type  Zoloft 100 daily- increase to Zoloft 150 mg daily  Add hydroxyzine 25 mg as needed for situational anxiety    MS  Stable, no longer on meds  Remission  Urge incontinence       Health maintenance- referrals provided  Mammogram due  Colon cancer screening due- cologuard  Cervical screening due  Physical schedule for mid- July, obtain baseline labs prior to appointment      BMI Counseling: Body mass index is 29 52 kg/m²  The BMI is above normal  Nutrition recommendations include decreasing portion sizes, encouraging healthy choices of fruits and vegetables, consuming healthier snacks, limiting drinks that contain sugar, moderation in carbohydrate intake, reducing intake of saturated and trans fat and reducing intake of cholesterol  Exercise recommendations include exercising 3-5 times per week  No pharmacotherapy was ordered  Rationale for BMI follow-up plan is due to patient being overweight or obese  No follow-ups on file  Chief Complaint:     No chief complaint on file  History of Present Illness:     Here today to establish  She was recently hospitalized on 23 for pyelonephritis  Symptoms have now resolved and she is feeling better     Colon resection and hystectomy due to abscesses in abdominal cavity   Increase symptoms of anxiety due to situational stress and new to moving ehr and helping dad    The following portions of the patient's history were reviewed and updated as appropriate: allergies, current medications, past family history, past medical history, past social history, past surgical history and problem list      Review of Systems:     Review of Systems   Constitutional: Negative for appetite change, chills, diaphoresis, fatigue, fever and unexpected weight change  HENT: Negative for postnasal drip and sneezing  Eyes: Negative for visual disturbance  Respiratory: Negative for chest tightness and shortness of breath  Cardiovascular: Negative for chest pain, palpitations and leg swelling  Gastrointestinal: Negative for abdominal pain and blood in stool  Endocrine: Negative for cold intolerance, heat intolerance, polydipsia, polyphagia and polyuria  Genitourinary: Negative for difficulty urinating, dysuria, frequency and urgency  Musculoskeletal: Negative for arthralgias and myalgias  Skin: Negative for rash and wound  Neurological: Negative for dizziness, weakness, light-headedness and headaches  Hematological: Negative for adenopathy  Psychiatric/Behavioral: Negative for confusion, dysphoric mood and sleep disturbance  The patient is nervous/anxious  Past Medical History:   No past medical history on file  Current Medications:     Current Outpatient Medications:   •  lisinopril (ZESTRIL) 10 mg tablet, Take 5 mg by mouth daily, Disp: , Rfl:   •  ondansetron (ZOFRAN) 4 mg tablet, Take 1 tablet (4 mg total) by mouth every 8 (eight) hours as needed for nausea or vomiting for up to 7 days, Disp: 21 tablet, Rfl: 0  •  saccharomyces boulardii (FLORASTOR) 250 mg capsule, Take 1 capsule (250 mg total) by mouth 2 (two) times a day, Disp: 60 capsule, Rfl: 0  •  sertraline (ZOLOFT) 100 mg tablet, Take 100 mg by mouth daily, Disp: , Rfl:      Allergies:      Allergies   Allergen Reactions   • Gluten Meal - Food Allergy Nausea Only        Physical Exam:     There were no vitals taken for this visit     Physical Exam  Constitutional:       Appearance: She is well-developed  HENT:      Head: Normocephalic and atraumatic  Eyes:      Pupils: Pupils are equal, round, and reactive to light  Neck:      Thyroid: No thyromegaly  Cardiovascular:      Rate and Rhythm: Normal rate and regular rhythm  Heart sounds: No murmur heard  Pulmonary:      Effort: Pulmonary effort is normal       Breath sounds: Normal breath sounds  Abdominal:      General: Bowel sounds are normal       Palpations: Abdomen is soft  Musculoskeletal:         General: Normal range of motion  Cervical back: Normal range of motion and neck supple  Lymphadenopathy:      Cervical: No cervical adenopathy  Skin:     General: Skin is warm and dry  Neurological:      Mental Status: She is alert and oriented to person, place, and time  Data:     Laboratory Results: I have personally reviewed the pertinent laboratory results/reports   Radiology/Other Diagnostic Testing Results: I have personally reviewed pertinent reports        JOSH Boles  [facility]